# Patient Record
Sex: MALE | Race: WHITE | NOT HISPANIC OR LATINO | Employment: FULL TIME | URBAN - METROPOLITAN AREA
[De-identification: names, ages, dates, MRNs, and addresses within clinical notes are randomized per-mention and may not be internally consistent; named-entity substitution may affect disease eponyms.]

---

## 2020-02-04 ENCOUNTER — HOSPITAL ENCOUNTER (EMERGENCY)
Facility: CLINIC | Age: 60
Discharge: HOME | End: 2020-02-04
Attending: PHYSICIAN ASSISTANT | Admitting: PHYSICIAN ASSISTANT
Payer: COMMERCIAL

## 2020-02-04 ENCOUNTER — APPOINTMENT (OUTPATIENT)
Dept: CT IMAGING | Facility: CLINIC | Age: 60
End: 2020-02-04
Attending: PHYSICIAN ASSISTANT
Payer: COMMERCIAL

## 2020-02-04 VITALS
OXYGEN SATURATION: 96 % | DIASTOLIC BLOOD PRESSURE: 89 MMHG | BODY MASS INDEX: 38.35 KG/M2 | TEMPERATURE: 98 F | RESPIRATION RATE: 16 BRPM | SYSTOLIC BLOOD PRESSURE: 170 MMHG | WEIGHT: 275 LBS | HEART RATE: 98 BPM

## 2020-02-04 DIAGNOSIS — L03.311 CELLULITIS OF LEFT ABDOMINAL WALL: ICD-10-CM

## 2020-02-04 LAB
ALBUMIN SERPL-MCNC: 3.3 G/DL (ref 3.4–5)
ALP SERPL-CCNC: 62 U/L (ref 40–150)
ALT SERPL W P-5'-P-CCNC: 33 U/L (ref 0–70)
ANION GAP SERPL CALCULATED.3IONS-SCNC: 8 MMOL/L (ref 3–14)
AST SERPL W P-5'-P-CCNC: 15 U/L (ref 0–45)
BASOPHILS # BLD AUTO: 0 10E9/L (ref 0–0.2)
BASOPHILS NFR BLD AUTO: 0.3 %
BILIRUB SERPL-MCNC: 1.5 MG/DL (ref 0.2–1.3)
BUN SERPL-MCNC: 24 MG/DL (ref 7–30)
CALCIUM SERPL-MCNC: 9.4 MG/DL (ref 8.5–10.1)
CHLORIDE SERPL-SCNC: 104 MMOL/L (ref 94–109)
CO2 SERPL-SCNC: 23 MMOL/L (ref 20–32)
CREAT SERPL-MCNC: 1 MG/DL (ref 0.66–1.25)
DIFFERENTIAL METHOD BLD: ABNORMAL
EOSINOPHIL # BLD AUTO: 0.1 10E9/L (ref 0–0.7)
EOSINOPHIL NFR BLD AUTO: 0.6 %
ERYTHROCYTE [DISTWIDTH] IN BLOOD BY AUTOMATED COUNT: 13.3 % (ref 10–15)
GFR SERPL CREATININE-BSD FRML MDRD: 82 ML/MIN/{1.73_M2}
GLUCOSE SERPL-MCNC: 371 MG/DL (ref 70–99)
HCT VFR BLD AUTO: 43.4 % (ref 40–53)
HGB BLD-MCNC: 14.5 G/DL (ref 13.3–17.7)
IMM GRANULOCYTES # BLD: 0.1 10E9/L (ref 0–0.4)
IMM GRANULOCYTES NFR BLD: 0.5 %
LACTATE BLD-SCNC: 2 MMOL/L (ref 0.7–2)
LYMPHOCYTES # BLD AUTO: 1.5 10E9/L (ref 0.8–5.3)
LYMPHOCYTES NFR BLD AUTO: 10.3 %
MCH RBC QN AUTO: 30.7 PG (ref 26.5–33)
MCHC RBC AUTO-ENTMCNC: 33.4 G/DL (ref 31.5–36.5)
MCV RBC AUTO: 92 FL (ref 78–100)
MONOCYTES # BLD AUTO: 1.3 10E9/L (ref 0–1.3)
MONOCYTES NFR BLD AUTO: 9.1 %
NEUTROPHILS # BLD AUTO: 11.3 10E9/L (ref 1.6–8.3)
NEUTROPHILS NFR BLD AUTO: 79.2 %
NRBC # BLD AUTO: 0 10*3/UL
NRBC BLD AUTO-RTO: 0 /100
PLATELET # BLD AUTO: 209 10E9/L (ref 150–450)
POTASSIUM SERPL-SCNC: 3.9 MMOL/L (ref 3.4–5.3)
PROT SERPL-MCNC: 7 G/DL (ref 6.8–8.8)
RBC # BLD AUTO: 4.72 10E12/L (ref 4.4–5.9)
SODIUM SERPL-SCNC: 135 MMOL/L (ref 133–144)
WBC # BLD AUTO: 14.2 10E9/L (ref 4–11)

## 2020-02-04 PROCEDURE — 85025 COMPLETE CBC W/AUTO DIFF WBC: CPT | Performed by: PHYSICIAN ASSISTANT

## 2020-02-04 PROCEDURE — 99284 EMERGENCY DEPT VISIT MOD MDM: CPT | Mod: Z6 | Performed by: PHYSICIAN ASSISTANT

## 2020-02-04 PROCEDURE — 25000125 ZZHC RX 250: Performed by: PHYSICIAN ASSISTANT

## 2020-02-04 PROCEDURE — 99285 EMERGENCY DEPT VISIT HI MDM: CPT | Mod: 25 | Performed by: PHYSICIAN ASSISTANT

## 2020-02-04 PROCEDURE — 83605 ASSAY OF LACTIC ACID: CPT | Performed by: PHYSICIAN ASSISTANT

## 2020-02-04 PROCEDURE — 74177 CT ABD & PELVIS W/CONTRAST: CPT

## 2020-02-04 PROCEDURE — 25000128 H RX IP 250 OP 636: Performed by: PHYSICIAN ASSISTANT

## 2020-02-04 PROCEDURE — 80053 COMPREHEN METABOLIC PANEL: CPT | Performed by: PHYSICIAN ASSISTANT

## 2020-02-04 RX ORDER — SULFAMETHOXAZOLE/TRIMETHOPRIM 800-160 MG
1 TABLET ORAL 2 TIMES DAILY
Qty: 20 TABLET | Refills: 0 | Status: SHIPPED | OUTPATIENT
Start: 2020-02-04 | End: 2020-02-14

## 2020-02-04 RX ORDER — CEPHALEXIN 500 MG/1
500 CAPSULE ORAL 4 TIMES DAILY
Qty: 40 CAPSULE | Refills: 0 | Status: SHIPPED | OUTPATIENT
Start: 2020-02-04 | End: 2020-02-14

## 2020-02-04 RX ORDER — TRIAMTERENE AND HYDROCHLOROTHIAZIDE 37.5; 25 MG/1; MG/1
1 CAPSULE ORAL DAILY
COMMUNITY
Start: 2019-08-23 | End: 2024-06-20

## 2020-02-04 RX ORDER — ATORVASTATIN CALCIUM 40 MG/1
40 TABLET, FILM COATED ORAL EVERY EVENING
COMMUNITY
Start: 2019-08-13 | End: 2024-07-01

## 2020-02-04 RX ORDER — IOPAMIDOL 755 MG/ML
100 INJECTION, SOLUTION INTRAVASCULAR ONCE
Status: COMPLETED | OUTPATIENT
Start: 2020-02-04 | End: 2020-02-04

## 2020-02-04 RX ORDER — GLIPIZIDE 5 MG/1
5 TABLET, FILM COATED, EXTENDED RELEASE ORAL
COMMUNITY
Start: 2019-08-13 | End: 2024-07-01

## 2020-02-04 RX ADMIN — IOPAMIDOL 100 ML: 755 INJECTION, SOLUTION INTRAVENOUS at 12:37

## 2020-02-04 RX ADMIN — SODIUM CHLORIDE 74 ML: 9 INJECTION, SOLUTION INTRAVENOUS at 12:38

## 2020-02-04 ASSESSMENT — ENCOUNTER SYMPTOMS
RESPIRATORY NEGATIVE: 1
MUSCULOSKELETAL NEGATIVE: 1
CARDIOVASCULAR NEGATIVE: 1
FEVER: 1
ABDOMINAL PAIN: 1
COLOR CHANGE: 1
CHILLS: 1
WOUND: 1

## 2020-02-04 NOTE — ED AVS SNAPSHOT
[facility]  [address] [address]  Phone:  [phone]  Fax:  [phone]                                    Frank Mace   MRN: [id number]    Department:  [facility]   Date of Visit:  2/4/2020           After Visit Summary Signature Page    I have received my discharge instructions, and my questions have been answered. I have discussed any challenges I see with this plan with the nurse or doctor.    ..........................................................................................................................................  Patient/Patient Representative Signature      ..........................................................................................................................................  Patient Representative Print Name and Relationship to Patient    ..................................................               ................................................  Date                                   Time    ..........................................................................................................................................  Reviewed by Signature/Title    ...................................................              ..............................................  Date                                               Time          22EPIC Rev 08/18

## 2020-02-04 NOTE — ED PROVIDER NOTES
History     Chief Complaint   Patient presents with     Abdominal Pain     lump on left side of abd     HPI  Frank Mace is a 59 year old male with history of diabetes mellitus who presents with complaints of painful lump to the left side of his abdomen over the past 2-3 days.  Patient states he intermittently develops areas of inflammation along where his pants rub.  This area to his left lower abdomen has started to drain fluid.  He states last night he had an episode of chills and sweats.  Denies nausea, vomiting, or urinary symptoms.      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    Past Surgical History:    No past surgical history on file.    Family History:    No family history on file.    Social History:  Marital Status:   [2]  Social History     Tobacco Use     Smoking status: Never Smoker     Smokeless tobacco: Never Used   Substance Use Topics     Alcohol use: Yes     Drug use: No        Medications:    Ascorbic Acid (VITAMIN C PO)  aspirin 325 MG tablet  atorvastatin (LIPITOR) 40 MG tablet  benazepril (LOTENSIN) 40 MG tablet  cephALEXin (KEFLEX) 500 MG capsule  CINNAMON PO  fish oil-omega-3 fatty acids (FISH OIL) 1000 MG capsule  glipiZIDE (GLUCOTROL XL) 10 MG 24 hr tablet  Multiple Vitamins-Minerals (AIRBORNE) CHEW  Multiple Vitamins-Minerals (MULTIVITAL PO)  sulfamethoxazole-trimethoprim (BACTRIM DS) 800-160 MG tablet  triamterene-HCTZ (DYAZIDE) 37.5-25 MG capsule  fluticasone (FLONASE) 50 MCG/ACT nasal spray          Review of Systems   Constitutional: Positive for chills and fever.   Respiratory: Negative.    Cardiovascular: Negative.    Gastrointestinal: Positive for abdominal pain.   Musculoskeletal:  Negative.    Skin: Positive for color change and wound.   All other systems reviewed and are negative.      Physical Exam   BP: (!) 170/89  Pulse: 98  Temp: 98  F (36.7  C)  Resp: 16  Weight: 124.7 kg (275 lb)  SpO2: 96 %      Physical Exam  Constitutional:       General: He is not in acute distress.     Appearance: He is well-developed. He is not ill-appearing, toxic-appearing or diaphoretic.   HENT:      Head: Normocephalic and atraumatic.      Nose: Nose normal.      Mouth/Throat:      Lips: Pink.      Mouth: Mucous membranes are moist.   Eyes:      Conjunctiva/sclera: Conjunctivae normal.      Pupils: Pupils are equal, round, and reactive to light.   Neck:      Musculoskeletal: Normal range of motion and neck supple.   Cardiovascular:      Rate and Rhythm: Normal rate and regular rhythm.      Pulses: Normal pulses.      Heart sounds: Normal heart sounds.   Pulmonary:      Effort: Pulmonary effort is normal. No respiratory distress.      Breath sounds: Normal breath sounds. No stridor. No wheezing.   Abdominal:      General: There is no distension.      Palpations: Abdomen is soft.      Tenderness: There is abdominal tenderness. There is no guarding or rebound.          Comments: Erythema, warmth, swelling, induration, and tenderness to left lower abdomen.  There is a small centrally located wound that is draining clear-appearing fluid.  No fluctuance to the area.   Musculoskeletal: Normal range of motion.   Skin:     General: Skin is warm and dry.      Capillary Refill: Capillary refill takes less than 2 seconds.   Neurological:      Mental Status: He is alert and oriented to person, place, and time.      Sensory: Sensation is intact.      Motor: Motor function is intact.         ED Course        Procedures    Results for orders placed or performed during the hospital encounter of 02/04/20 (from the past 24 hour(s))   CBC with platelets differential   Result Value Ref Range    WBC 14.2 (H) 4.0 - 11.0 10e9/L     RBC Count 4.72 4.4 - 5.9 10e12/L    Hemoglobin 14.5 13.3 - 17.7 g/dL    Hematocrit 43.4 40.0 - 53.0 %    MCV 92 78 - 100 fl    MCH 30.7 26.5 - 33.0 pg    MCHC 33.4 31.5 - 36.5 g/dL    RDW 13.3 10.0 - 15.0 %    Platelet Count 209 150 - 450 10e9/L    Diff Method Automated Method     % Neutrophils 79.2 %    % Lymphocytes 10.3 %    % Monocytes 9.1 %    % Eosinophils 0.6 %    % Basophils 0.3 %    % Immature Granulocytes 0.5 %    Nucleated RBCs 0 0 /100    Absolute Neutrophil 11.3 (H) 1.6 - 8.3 10e9/L    Absolute Lymphocytes 1.5 0.8 - 5.3 10e9/L    Absolute Monocytes 1.3 0.0 - 1.3 10e9/L    Absolute Eosinophils 0.1 0.0 - 0.7 10e9/L    Absolute Basophils 0.0 0.0 - 0.2 10e9/L    Abs Immature Granulocytes 0.1 0 - 0.4 10e9/L    Absolute Nucleated RBC 0.0    Comprehensive metabolic panel   Result Value Ref Range    Sodium 135 133 - 144 mmol/L    Potassium 3.9 3.4 - 5.3 mmol/L    Chloride 104 94 - 109 mmol/L    Carbon Dioxide 23 20 - 32 mmol/L    Anion Gap 8 3 - 14 mmol/L    Glucose 371 (H) 70 - 99 mg/dL    Urea Nitrogen 24 7 - 30 mg/dL    Creatinine 1.00 0.66 - 1.25 mg/dL    GFR Estimate 82 >60 mL/min/[1.73_m2]    GFR Estimate If Black >90 >60 mL/min/[1.73_m2]    Calcium 9.4 8.5 - 10.1 mg/dL    Bilirubin Total 1.5 (H) 0.2 - 1.3 mg/dL    Albumin 3.3 (L) 3.4 - 5.0 g/dL    Protein Total 7.0 6.8 - 8.8 g/dL    Alkaline Phosphatase 62 40 - 150 U/L    ALT 33 0 - 70 U/L    AST 15 0 - 45 U/L   Lactic acid whole blood   Result Value Ref Range    Lactic Acid 2.0 0.7 - 2.0 mmol/L   CT Abdomen Pelvis w Contrast    Narrative    CT ABDOMEN AND PELVIS WITH CONTRAST   2/4/2020 12:48 PM     HISTORY: Left lower quadrant pain and erythema. History of diabetes.    TECHNIQUE: 100 mL Isovue 370 IV were administered. After contrast  administration, volumetric helical sections were acquired from the  lung bases to the ischial tuberosities. Coronal images were also  reconstructed. Radiation dose for this scan was reduced using  automated exposure  control, adjustment of the mA and/or kV according  to patient size, or iterative reconstruction technique.    COMPARISON: None.     FINDINGS: Ill-defined 5.3 cm area of hazy increased density in the  subcutaneous fat overlying the left lower quadrant anteriorly may be  related to bruising or inflammation. No associated fluid collection to  suggest abscess or hematoma.    No bowel obstruction. No convincing evidence for colitis or  diverticulitis. Unremarkable appendix. No free fluid in the pelvis.  Mild atherosclerotic aortoiliac calcification. There are a few small  bilateral renal cortical cysts, with the largest in the lower pole on  the right measuring 2 cm. There is diffuse fatty infiltration of the  liver. The liver, gallbladder, spleen, adrenal glands, pancreas, and  kidneys are otherwise unremarkable. No hydronephrosis. No  intra-abdominal fluid collections. The visualized lung bases are  clear. Degenerative changes are noted in the visualized thoracolumbar  spine.      Impression    IMPRESSION:   1. Ill-defined area of hazy increased density in the subcutaneous fat  overlying the left lower quadrant anteriorly may be related to  bruising or inflammation. No evidence for associated fluid collection.  2. Diffuse fatty infiltration of the liver.    LADY DUVALL MD       Medications   iopamidol (ISOVUE-370) solution 100 mL (100 mLs Intravenous Given 2/4/20 1237)   sodium chloride 0.9 % bag 500mL for CT scan flush use (74 mLs As instructed Given 2/4/20 1238)       Assessments & Plan (with Medical Decision Making)     Pt is a 59 year old male with history of diabetes mellitus who presents with complaints of painful lump to the left side of his abdomen over the past 2-3 days.  This area to his left lower abdomen has started to drain fluid.  He states last night he had an episode of chills and sweats.  Pt is afebrile on arrival.  Exam as above.  Lactic acid is 2.  White blood cell count is 14,000.  CT of  abdomen and pelvis was obtained in order to evaluate for deeper infection/abscess in the abdomen and shows an ill-defined area of hazy increased density in the subcutaneous fat overlying the left lower quadrant; no evidence of associated fluid collection.  Discussed results with patient.  Return precautions were reviewed.  Hand-outs were provided.    Patient was sent with Bactrim and Keflex and was instructed to follow-up with PCP if no improvement in 2-3 days for continued care and management or sooner if new or worsening symptoms.  He is to return to the ED for persistent and/or worsening symptoms.  Patient expressed understanding of the diagnosis and plan and was discharged home in good condition.    I have reviewed the nursing notes.    I have reviewed the findings, diagnosis, plan and need for follow up with the patient.    Discharge Medication List as of 2/4/2020  1:31 PM      START taking these medications    Details   cephALEXin (KEFLEX) 500 MG capsule Take 1 capsule (500 mg) by mouth 4 times daily for 10 days, Disp-40 capsule, R-0, E-Prescribe      sulfamethoxazole-trimethoprim (BACTRIM DS) 800-160 MG tablet Take 1 tablet by mouth 2 times daily for 10 days, Disp-20 tablet, R-0, E-Prescribe             Final diagnoses:   Cellulitis of left abdominal wall       2/4/2020   [facility] EMERGENCY DEPARTMENT      Disclaimer:  This note consists of symbols derived from keyboarding, dictation and/or voice recognition software.  As a result, there may be errors in the script that have gone undetected.  Please consider this when interpreting information found in this chart.     Rosemary Angeles PA-C  02/04/20 1746

## 2020-02-04 NOTE — ED NOTES
Area of redness,swelling and pain LLQ with small draining lesion and one healed lesion that was first noted 2 days ago

## 2023-10-18 ENCOUNTER — TRANSFERRED RECORDS (OUTPATIENT)
Dept: MULTI SPECIALTY CLINIC | Facility: CLINIC | Age: 63
End: 2023-10-18

## 2023-10-18 LAB — RETINOPATHY: NORMAL

## 2024-06-03 ENCOUNTER — LAB REQUISITION (OUTPATIENT)
Dept: LAB | Facility: CLINIC | Age: 64
End: 2024-06-03
Payer: COMMERCIAL

## 2024-06-03 DIAGNOSIS — M25.462 EFFUSION, LEFT KNEE: ICD-10-CM

## 2024-06-03 LAB
APPEARANCE FLD: CLEAR
CELL COUNT BODY FLUID SOURCE: NORMAL
COLOR FLD: YELLOW
CRYSTALS SNV MICRO: NORMAL
LYMPHOCYTES NFR FLD MANUAL: 37 %
MONOS+MACROS NFR FLD MANUAL: 49 %
NEUTS BAND NFR FLD MANUAL: 14 %
WBC # FLD AUTO: 544 /UL

## 2024-06-03 PROCEDURE — 89051 BODY FLUID CELL COUNT: CPT | Mod: ORL | Performed by: PHYSICIAN ASSISTANT

## 2024-06-03 PROCEDURE — 87070 CULTURE OTHR SPECIMN AEROBIC: CPT | Mod: ORL | Performed by: PHYSICIAN ASSISTANT

## 2024-06-03 PROCEDURE — 89060 EXAM SYNOVIAL FLUID CRYSTALS: CPT | Mod: ORL | Performed by: PHYSICIAN ASSISTANT

## 2024-06-08 LAB — BACTERIA SNV CULT: NO GROWTH

## 2024-06-14 ENCOUNTER — TRANSFERRED RECORDS (OUTPATIENT)
Dept: HEALTH INFORMATION MANAGEMENT | Facility: CLINIC | Age: 64
End: 2024-06-14
Payer: COMMERCIAL

## 2024-06-20 ENCOUNTER — OFFICE VISIT (OUTPATIENT)
Dept: FAMILY MEDICINE | Facility: CLINIC | Age: 64
End: 2024-06-20
Payer: COMMERCIAL

## 2024-06-20 VITALS
WEIGHT: 226 LBS | SYSTOLIC BLOOD PRESSURE: 142 MMHG | HEIGHT: 71 IN | DIASTOLIC BLOOD PRESSURE: 70 MMHG | OXYGEN SATURATION: 98 % | RESPIRATION RATE: 16 BRPM | BODY MASS INDEX: 31.64 KG/M2 | HEART RATE: 56 BPM

## 2024-06-20 DIAGNOSIS — E79.0 ELEVATED URIC ACID IN BLOOD: ICD-10-CM

## 2024-06-20 DIAGNOSIS — E11.22 TYPE 2 DIABETES MELLITUS WITH STAGE 3 CHRONIC KIDNEY DISEASE, WITHOUT LONG-TERM CURRENT USE OF INSULIN, UNSPECIFIED WHETHER STAGE 3A OR 3B CKD (H): ICD-10-CM

## 2024-06-20 DIAGNOSIS — D64.9 ANEMIA, UNSPECIFIED TYPE: ICD-10-CM

## 2024-06-20 DIAGNOSIS — E78.2 MIXED HYPERLIPIDEMIA: ICD-10-CM

## 2024-06-20 DIAGNOSIS — Z12.11 COLON CANCER SCREENING: ICD-10-CM

## 2024-06-20 DIAGNOSIS — Z00.00 ROUTINE GENERAL MEDICAL EXAMINATION AT A HEALTH CARE FACILITY: Primary | ICD-10-CM

## 2024-06-20 DIAGNOSIS — N18.30 TYPE 2 DIABETES MELLITUS WITH STAGE 3 CHRONIC KIDNEY DISEASE, WITHOUT LONG-TERM CURRENT USE OF INSULIN, UNSPECIFIED WHETHER STAGE 3A OR 3B CKD (H): ICD-10-CM

## 2024-06-20 DIAGNOSIS — M25.469 SWELLING OF KNEE: ICD-10-CM

## 2024-06-20 DIAGNOSIS — Z12.5 SCREENING FOR PROSTATE CANCER: ICD-10-CM

## 2024-06-20 LAB
ANION GAP SERPL CALCULATED.3IONS-SCNC: 18 MMOL/L (ref 7–15)
BASOPHILS # BLD AUTO: 0 10E3/UL (ref 0–0.2)
BASOPHILS NFR BLD AUTO: 0 %
BUN SERPL-MCNC: 52 MG/DL (ref 8–23)
CALCIUM SERPL-MCNC: 9.9 MG/DL (ref 8.8–10.2)
CHLORIDE SERPL-SCNC: 108 MMOL/L (ref 98–107)
CHOLEST SERPL-MCNC: 149 MG/DL
CREAT SERPL-MCNC: 1.41 MG/DL (ref 0.67–1.17)
DEPRECATED HCO3 PLAS-SCNC: 17 MMOL/L (ref 22–29)
EGFRCR SERPLBLD CKD-EPI 2021: 56 ML/MIN/1.73M2
EOSINOPHIL # BLD AUTO: 0.3 10E3/UL (ref 0–0.7)
EOSINOPHIL NFR BLD AUTO: 3 %
ERYTHROCYTE [DISTWIDTH] IN BLOOD BY AUTOMATED COUNT: 13.7 % (ref 10–15)
FASTING STATUS PATIENT QL REPORTED: YES
FASTING STATUS PATIENT QL REPORTED: YES
FERRITIN SERPL-MCNC: 1160 NG/ML (ref 31–409)
GLUCOSE SERPL-MCNC: 114 MG/DL (ref 70–99)
HBA1C MFR BLD: 5.4 % (ref 0–5.6)
HCT VFR BLD AUTO: 32.9 % (ref 40–53)
HDLC SERPL-MCNC: 37 MG/DL
HGB BLD-MCNC: 11.1 G/DL (ref 13.3–17.7)
IMM GRANULOCYTES # BLD: 0 10E3/UL
IMM GRANULOCYTES NFR BLD: 0 %
IRON BINDING CAPACITY (ROCHE): 267 UG/DL (ref 240–430)
IRON SATN MFR SERPL: 28 % (ref 15–46)
IRON SERPL-MCNC: 75 UG/DL (ref 61–157)
LDLC SERPL CALC-MCNC: 93 MG/DL
LYMPHOCYTES # BLD AUTO: 1 10E3/UL (ref 0.8–5.3)
LYMPHOCYTES NFR BLD AUTO: 10 %
MCH RBC QN AUTO: 29.8 PG (ref 26.5–33)
MCHC RBC AUTO-ENTMCNC: 33.7 G/DL (ref 31.5–36.5)
MCV RBC AUTO: 88 FL (ref 78–100)
MONOCYTES # BLD AUTO: 0.7 10E3/UL (ref 0–1.3)
MONOCYTES NFR BLD AUTO: 7 %
NEUTROPHILS # BLD AUTO: 8.6 10E3/UL (ref 1.6–8.3)
NEUTROPHILS NFR BLD AUTO: 80 %
NONHDLC SERPL-MCNC: 112 MG/DL
PLATELET # BLD AUTO: 227 10E3/UL (ref 150–450)
POTASSIUM SERPL-SCNC: 4.2 MMOL/L (ref 3.4–5.3)
PSA SERPL DL<=0.01 NG/ML-MCNC: 2.17 NG/ML (ref 0–4.5)
RBC # BLD AUTO: 3.72 10E6/UL (ref 4.4–5.9)
SODIUM SERPL-SCNC: 143 MMOL/L (ref 135–145)
TRIGL SERPL-MCNC: 94 MG/DL
URATE SERPL-MCNC: 10 MG/DL (ref 3.4–7)
WBC # BLD AUTO: 10.7 10E3/UL (ref 4–11)

## 2024-06-20 PROCEDURE — 80048 BASIC METABOLIC PNL TOTAL CA: CPT | Performed by: FAMILY MEDICINE

## 2024-06-20 PROCEDURE — G0103 PSA SCREENING: HCPCS | Performed by: FAMILY MEDICINE

## 2024-06-20 PROCEDURE — 85025 COMPLETE CBC W/AUTO DIFF WBC: CPT | Performed by: FAMILY MEDICINE

## 2024-06-20 PROCEDURE — 80061 LIPID PANEL: CPT | Performed by: FAMILY MEDICINE

## 2024-06-20 PROCEDURE — 99214 OFFICE O/P EST MOD 30 MIN: CPT | Mod: 25 | Performed by: FAMILY MEDICINE

## 2024-06-20 PROCEDURE — 84550 ASSAY OF BLOOD/URIC ACID: CPT | Performed by: FAMILY MEDICINE

## 2024-06-20 PROCEDURE — 83036 HEMOGLOBIN GLYCOSYLATED A1C: CPT | Performed by: FAMILY MEDICINE

## 2024-06-20 PROCEDURE — 99386 PREV VISIT NEW AGE 40-64: CPT | Performed by: FAMILY MEDICINE

## 2024-06-20 PROCEDURE — 82728 ASSAY OF FERRITIN: CPT | Performed by: FAMILY MEDICINE

## 2024-06-20 PROCEDURE — 86618 LYME DISEASE ANTIBODY: CPT | Performed by: FAMILY MEDICINE

## 2024-06-20 PROCEDURE — 83540 ASSAY OF IRON: CPT | Performed by: FAMILY MEDICINE

## 2024-06-20 PROCEDURE — 36415 COLL VENOUS BLD VENIPUNCTURE: CPT | Performed by: FAMILY MEDICINE

## 2024-06-20 PROCEDURE — 83550 IRON BINDING TEST: CPT | Performed by: FAMILY MEDICINE

## 2024-06-20 RX ORDER — HYDROCHLOROTHIAZIDE 25 MG/1
25 TABLET ORAL
COMMUNITY
Start: 2024-06-05 | End: 2024-07-01

## 2024-06-20 RX ORDER — METOPROLOL SUCCINATE 50 MG/1
TABLET, EXTENDED RELEASE ORAL
COMMUNITY
Start: 2023-11-30 | End: 2024-07-01

## 2024-06-20 RX ORDER — HYDRALAZINE HYDROCHLORIDE 10 MG/1
TABLET, FILM COATED ORAL
COMMUNITY
Start: 2023-06-24 | End: 2024-07-01

## 2024-06-20 RX ORDER — BACILLUS COAGULANS 1B CELL
CAPSULE ORAL
COMMUNITY
End: 2024-06-20

## 2024-06-20 RX ORDER — LOSARTAN POTASSIUM 25 MG/1
TABLET ORAL
COMMUNITY
End: 2024-07-01

## 2024-06-20 RX ORDER — AMLODIPINE BESYLATE 5 MG/1
TABLET ORAL
COMMUNITY
Start: 2022-10-21 | End: 2024-07-01

## 2024-06-20 RX ORDER — AMOXICILLIN 500 MG/1
TABLET, FILM COATED ORAL
COMMUNITY

## 2024-06-20 RX ORDER — GABAPENTIN 300 MG/1
300 CAPSULE ORAL
COMMUNITY
Start: 2024-06-11 | End: 2024-07-01

## 2024-06-20 RX ORDER — VITAMIN B COMPLEX
1000 TABLET ORAL
COMMUNITY

## 2024-06-20 RX ORDER — PYRIDOXINE HCL (VITAMIN B6) 100 MG
TABLET ORAL
COMMUNITY

## 2024-06-20 SDOH — HEALTH STABILITY: PHYSICAL HEALTH: ON AVERAGE, HOW MANY DAYS PER WEEK DO YOU ENGAGE IN MODERATE TO STRENUOUS EXERCISE (LIKE A BRISK WALK)?: 0 DAYS

## 2024-06-20 ASSESSMENT — SOCIAL DETERMINANTS OF HEALTH (SDOH): HOW OFTEN DO YOU GET TOGETHER WITH FRIENDS OR RELATIVES?: ONCE A WEEK

## 2024-06-20 NOTE — PROGRESS NOTES
Preventive Care Visit  [facility]  JUNE ALDRICH DO, Family Medicine  Jun 20, 2024      Assessment & Plan     Routine general medical examination at a health care facility    Type 2 diabetes mellitus with stage 3 chronic kidney disease, without long-term current use of insulin, unspecified whether stage 3a or 3b CKD (H)  Recheck A1c-well controlled  pn glipizide cirrenty   - Hemoglobin A1c  - Basic metabolic panel  (Ca, Cl, CO2, Creat, Gluc, K, Na, BUN)  - Hemoglobin A1c  - Basic metabolic panel  (Ca, Cl, CO2, Creat, Gluc, K, Na, BUN)    Swelling of knee  Unclear cause it has slowly improved but does note some swelling in his left ankle as well-had one swelling on ankle about 10 years ago without fluid analysis that was told was possibly gout. On fluid analysis from [facility] of knee it did not show crystals thus I am thinking less likely gout flare. Hesitatnt to start urate lowering therapy in setting of his CKD  - Uric acid  - Lyme Disease Total Abs Bld with Reflex to Confirm CLIA  - Uric acid  - Lyme Disease Total Abs Bld with Reflex to Confirm CLIA    Anemia, unspecified type  He has MGUS-reviewed his CBC and noted ot have worsening anemia starting in March, 2023-he started on iron supplements in November of 2023 at direction of his orthopedic team after surgery. HE has remained anemic with mild improvement in anemia-ferritin is elevated in setting of his CKD and MGUS, however iron stores are low normal despite taking iron. He will update his cologuard-eats iron rich foods. Can consider colonscopy if needed. Follow up with his oncologist   - CBC with platelets and differential  - Ferritin  - Iron and iron binding capacity  - CBC with platelets and differential  - Ferritin  - Iron and iron binding capacity    Mixed hyperlipidemia  - Lipid panel reflex to direct LDL Fasting  - Lipid panel reflex to direct LDL Fasting    Screening for prostate cancer  - PSA, screen  - PSA,  "screen    Elevated uric acid in blood  No crystals seen on fluid analysis of left knee after swelling-discussed likely not gout. ? Of elevation in setting of his CKD       Patient has been advised of split billing requirements and indicates understanding: Yes        BMI  Estimated body mass index is 31.52 kg/m  as calculated from the following:    Height as of this encounter: 1.803 m (5' 11\").    Weight as of this encounter: 102.5 kg (226 lb).       Counseling  Appropriate preventive services were discussed with this patient, including applicable screening as appropriate for fall prevention, nutrition, physical activity, Tobacco-use cessation, weight loss and cognition.  Checklist reviewing preventive services available has been given to the patient.  Reviewed patient's diet, addressing concerns and/or questions.       Mathew Marie is a 63 year old, presenting for the following:  Physical and Establish Care        6/20/2024     8:22 AM   Additional Questions   Roomed by Kecia SOLIS MA   Accompanied by Self        Health Care Directive  Patient does not have a Health Care Directive or Living Will: Discussed advance care planning with patient; information given to patient to review.    HPI    Discuss a couple prescriptions    Used to go to [facility] in [place]    Would like his PSA checked.  Did see the urologist last Friday and had a cystoscopy done.  Also uric acid levels checked for gout.  A few weeks ago, left knee became swollen.  Fluid was aspirated from the joint at [facility] and tested negative for gout.    Nephrologist wants him to ask about SGLT-2 inhibitor and a GLP-1 agonist.    Low hemoglobin.  Started taking iron supplements since his knee replacement.  Hgb was checked while on the iron and didn't increase.    Gabapentin-300 mg daily since December.  Last couple weeks ran out and has since gotten a refill.  Would like to just stay off of it.  Was taking it for neuropathy    MGUS-following with oncology "     Is fasting today for labs  HX of mild neuropathy-tried gabapentin but did not seem help.   Saw [facility] amonth ago swelling in left knee and ankle - Wondering if this is from lymes or gout-fluid studies from [facility] showed no infection or crystals         6/20/2024   General Health   How would you rate your overall physical health? (!) POOR   Feel stress (tense, anxious, or unable to sleep) To some extent      (!) STRESS CONCERN      6/20/2024   Nutrition   Three or more servings of calcium each day? (!) NO   Diet: Low salt    Carbohydrate counting    Other   If other, please elaborate: modified paleo   How many servings of fruit and vegetables per day? 4 or more   How many sweetened beverages each day? 0-1       Multiple values from one day are sorted in reverse-chronological order         6/20/2024   Exercise   Days per week of moderate/strenous exercise 0 days      (!) EXERCISE CONCERN      6/20/2024   Social Factors   Frequency of gathering with friends or relatives Once a week   Worry food won't last until get money to buy more No   Food not last or not have enough money for food? No   Do you have housing? (Housing is defined as stable permanent housing and does not include staying ouside in a car, in a tent, in an abandoned building, in an overnight shelter, or couch-surfing.) Yes   Are you worried about losing your housing? No   Lack of transportation? No   Unable to get utilities (heat,electricity)? No          6/20/2024   Fall Risk   Fallen 2 or more times in the past year? No   Trouble with walking or balance? Yes   Gait Speed Test (Document in seconds) 3   Gait Speed Test Interpretation Less than or equal to 5.00 seconds - PASS            6/20/2024   Dental   Dentist two times every year? Yes            6/20/2024   TB Screening   Were you born outside of the [place]? No      Today's PHQ-2 Score:       6/20/2024     8:10 AM   PHQ-2 ( 1999 [employer])   Q1: Little interest or pleasure in doing things  "2   Q2: Feeling down, depressed or hopeless 0   PHQ-2 Score 2   Q1: Little interest or pleasure in doing things More than half the days   Q2: Feeling down, depressed or hopeless Not at all   PHQ-2 Score 2         6/20/2024   Substance Use   Alcohol more than 3/day or more than 7/wk No   Do you use any other substances recreationally? (!) OTHER        Social History     Tobacco Use    Smoking status: Never     Passive exposure: Never    Smokeless tobacco: Never   Vaping Use    Vaping status: Never Used   Substance Use Topics    Alcohol use: Yes    Drug use: No         6/20/2024   STI Screening   New sexual partner(s) since last STI/HIV test? No      Last PSA:   Prostate Specific Antigen Screen   Date Value Ref Range Status   06/20/2024 2.17 0.00 - 4.50 ng/mL Final     ASCVD Risk   The 10-year ASCVD risk score (Jadon PADILLA, et al., 2019) is: 21.1%    Values used to calculate the score:      Age: 63 years      Sex: Male      Is Non- : No      Diabetic: Yes      Tobacco smoker: No      Systolic Blood Pressure: 142 mmHg      Is BP treated: No      HDL Cholesterol: 37 mg/dL      Total Cholesterol: 149 mg/dL    Reviewed and updated as needed this visit by Provider                    No past medical history on file.  No past surgical history on file.      Review of Systems  Constitutional, HEENT, cardiovascular, pulmonary, gi and gu systems are negative, except as otherwise noted.     Objective    Exam  BP (!) 142/70 (BP Location: Right arm, Patient Position: Chair, Cuff Size: Adult Large)   Pulse 56   Resp 16   Ht 1.803 m (5' 11\")   Wt 102.5 kg (226 lb)   SpO2 98%   BMI 31.52 kg/m     Estimated body mass index is 31.52 kg/m  as calculated from the following:    Height as of this encounter: 1.803 m (5' 11\").    Weight as of this encounter: 102.5 kg (226 lb).    Physical Exam  Constitutional:       Appearance: Normal appearance.   HENT:      Right Ear: Tympanic membrane normal.      Left " Ear: Tympanic membrane normal.      Mouth/Throat:      Mouth: Mucous membranes are moist.   Eyes:      Pupils: Pupils are equal, round, and reactive to light.   Cardiovascular:      Rate and Rhythm: Normal rate and regular rhythm.      Pulses: Normal pulses.      Heart sounds: Normal heart sounds.   Pulmonary:      Effort: Pulmonary effort is normal.   Abdominal:      General: Bowel sounds are normal.   Skin:     General: Skin is warm and dry.   Neurological:      General: No focal deficit present.      Mental Status: He is alert.   Psychiatric:         Mood and Affect: Mood normal.         Thought Content: Thought content normal.           Signed Electronically by: JUNE ALDRICH DO

## 2024-06-20 NOTE — PATIENT INSTRUCTIONS
"Patient Education   Preventive Care Advice   This is general advice we often give to help people stay healthy. Your care team may have specific advice just for you. Please talk to your care team about your own preventive care needs.  Lifestyle  Exercise at least 150 minutes each week (30 minutes a day, 5 days a week).  Do muscle strengthening activities 2 days a week. These help control your weight and prevent disease.  No smoking.  Wear sunscreen to prevent skin cancer.  Have your home tested for radon every 2 to 5 years. Radon is a colorless, odorless gas that can harm your lungs. To learn more, go to www.health.[facility].[place].us and search for \"Radon in Homes.\"  Keep guns unloaded and locked up in a safe place like a safe or gun vault, or, use a gun lock and hide the keys. Always lock away bullets separately. To learn more, visit [employer].[place].gov and search for \"safe gun storage.\"  Nutrition  Eat 5 or more servings of fruits and vegetables each day.  Try wheat bread, brown rice and whole grain pasta (instead of white bread, rice, and pasta).  Get enough calcium and vitamin D. Check the label on foods and aim for 100% of the RDA (recommended daily allowance).  Regular exams  Have a dental exam and cleaning every 6 months.  See your health care team every year to talk about:  Any changes in your health.  Any medicines your care team has prescribed.  Preventive care, family planning, and ways to prevent chronic diseases.  Shots (vaccines)   HPV shots (up to age 26), if you've never had them before.  Hepatitis B shots (up to age 59), if you've never had them before.  COVID-19 shot: Get this shot when it's due.  Flu shot: Get a flu shot every year.  Tetanus shot: Get a tetanus shot every 10 years.  Pneumococcal, hepatitis A, and RSV shots: Ask your care team if you need these based on your risk.  Shingles shot (for age 50 and up).  General health tests  Diabetes screening:  Starting at age 35, Get screened for diabetes at least " every 3 years.  If you are younger than age 35, ask your care team if you should be screened for diabetes.  Cholesterol test: At age 39, start having a cholesterol test every 5 years, or more often if advised.  Bone density scan (DEXA): At age 50, ask your care team if you should have this scan for osteoporosis (brittle bones).  Hepatitis C: Get tested at least once in your life.  Abdominal aortic aneurysm screening: Talk to your doctor about having this screening if you:  Have ever smoked; and  Are biologically male; and  Are between the ages of 65 and 75.  STIs (sexually transmitted infections)  Before age 24: Ask your care team if you should be screened for STIs.  After age 24: Get screened for STIs if you're at risk. You are at risk for STIs (including HIV) if:  You are sexually active with more than one person.  You don't use condoms every time.  You or a partner was diagnosed with a sexually transmitted infection.  If you are at risk for HIV, ask about PrEP medicine to prevent HIV.  Get tested for HIV at least once in your life, whether you are at risk for HIV or not.  Cancer screening tests  Cervical cancer screening: If you have a cervix, begin getting regular cervical cancer screening tests at age 21. Most people who have regular screenings with normal results can stop after age 65. Talk about this with your provider.  Breast cancer scan (mammogram): If you've ever had breasts, begin having regular mammograms starting at age 40. This is a scan to check for breast cancer.  Colon cancer screening: It is important to start screening for colon cancer at age 45.  Have a colonoscopy test every 10 years (or more often if you're at risk) Or, ask your provider about stool tests like a FIT test every year or Cologuard test every 3 years.  To learn more about your testing options, visit: www.[employer]/567819.pdf.  For help making a decision, visit: amaury/ap24555.  Prostate cancer screening test: If you have a  prostate and are age 55 to 69, ask your provider if you would benefit from a yearly prostate cancer screening test.  Lung cancer screening: If you are a current or former smoker age 50 to 80, ask your care team if ongoing lung cancer screenings are right for you.  For informational purposes only. Not to replace the advice of your health care provider. Copyright   2023 [facility]. All rights reserved. Clinically reviewed by the  [facility]. [employer] [id number] - REV 04/24.  Preventing Falls: Care Instructions  Injuries and health problems such as trouble walking or poor eyesight can increase your risk of falling. So can some medicines. But there are things you can do to help prevent falls. You can exercise to get stronger. You can also arrange your home to make it safer.    Talk to your doctor about the medicines you take. Ask if any of them increase the risk of falls and whether they can be changed or stopped.   Try to exercise regularly. It can help improve your strength and balance. This can help lower your risk of falling.     Practice fall safety and prevention.    Wear low-heeled shoes that fit well and give your feet good support. Talk to your doctor if you have foot problems that make this hard.  Carry a cellphone or wear a medical alert device that you can use to call for help.  Use stepladders instead of chairs to reach high objects. Don't climb if you're at risk for falls. Ask for help, if needed.  Wear the correct eyeglasses, if you need them.    Make your home safer.    Remove rugs, cords, clutter, and furniture from walkways.  Keep your house well lit. Use night-lights in hallways and bathrooms.  Install and use sturdy handrails on stairways.  Wear nonskid footwear, even inside. Don't walk barefoot or in socks without shoes.    Be safe outside.    Use handrails, curb cuts, and ramps whenever possible.  Keep your hands free by using a shoulder bag or backpack.  Try  "to walk in well-lit areas. Watch out for uneven ground, changes in pavement, and debris.  Be careful in the winter. Walk on the grass or gravel when sidewalks are slippery. Use de-icer on steps and walkways. Add non-slip devices to shoes.    Put grab bars and nonskid mats in your shower or tub and near the toilet. Try to use a shower chair or bath bench when bathing.   Get into a tub or shower by putting in your weaker leg first. Get out with your strong side first. Have a phone or medical alert device in the bathroom with you.   Where can you learn more?  Go to https://www.[employer].net/patiented  Enter G117 in the search box to learn more about \"Preventing Falls: Care Instructions.\"  Current as of: July 17, 2023               Content Version: 14.0    6082-6379 [employer].   Care instructions adapted under license by your healthcare professional. If you have questions about a medical condition or this instruction, always ask your healthcare professional. [employer] disclaims any warranty or liability for your use of this information.      Substance Use Disorder: Care Instructions  Overview     You can improve your life and health by stopping your use of alcohol or drugs. When you don't drink or use drugs, you may feel and sleep better. You may get along better with your family, friends, and coworkers. There are medicines and programs that can help with substance use disorder.  How can you care for yourself at home?  Here are some ways to help you stay sober and prevent relapse.  If you have been given medicine to help keep you sober or reduce your cravings, be sure to take it exactly as prescribed.  Talk to your doctor about programs that can help you stop using drugs or drinking alcohol.  Do not keep alcohol or drugs in your home.  Plan ahead. Think about what you'll say if other people ask you to drink or use drugs. Try not to spend time with people who drink or use drugs.  Use the " time and money spent on drinking or drugs to do something that's important to you.  Preventing a relapse  Have a plan to deal with relapse. Learn to recognize changes in your thinking that lead you to drink or use drugs. Get help before you start to drink or use drugs again.  Try to stay away from situations, friends, or places that may lead you to drink or use drugs.  If you feel the need to drink alcohol or use drugs again, seek help right away. Call a trusted friend or family member. Some people get support from organizations such as Narcotics Anonymous or [employer] or from treatment facilities.  If you relapse, get help as soon as you can. Some people make a plan with another person that outlines what they want that person to do for them if they relapse. The plan usually includes how to handle the relapse and who to notify in case of relapse.  Don't give up. Remember that a relapse doesn't mean that you have failed. Use the experience to learn the triggers that lead you to drink or use drugs. Then quit again. Recovery is a lifelong process. Many people have several relapses before they are able to quit for good.  Follow-up care is a key part of your treatment and safety. Be sure to make and go to all appointments, and call your doctor if you are having problems. It's also a good idea to know your test results and keep a list of the medicines you take.  When should you call for help?   Call 911  anytime you think you may need emergency care. For example, call if you or someone else:    Has overdosed or has withdrawal signs. Be sure to tell the emergency workers that you are or someone else is using or trying to quit using drugs. Overdose or withdrawal signs may include:  Losing consciousness.  Seizure.  Seeing or hearing things that aren't there (hallucinations).     Is thinking or talking about suicide or harming others.   Where to get help 24 hours a day, 7 days a week   If you or someone you know talks  "about suicide, self-harm, a mental health crisis, a substance use crisis, or any other kind of emotional distress, get help right away. You can:    Call the Suicide and Crisis Lifeline at 988.     Call 0-293-821-RDRF (1-836.667.2902).     Text HOME to 293197 to access the Crisis Text Line.   Consider saving these numbers in your phone.  Go to Kii for more information or to chat online.  Call your doctor now or seek immediate medical care if:    You are having withdrawal symptoms. These may include nausea or vomiting, sweating, shakiness, and anxiety.   Watch closely for changes in your health, and be sure to contact your doctor if:    You have a relapse.     You need more help or support to stop.   Where can you learn more?  Go to https://www.TabUp.net/patiented  Enter H573 in the search box to learn more about \"Substance Use Disorder: Care Instructions.\"  Current as of: November 15, 2023               Content Version: 14.0    2685-1819 1-800-DENTIST.   Care instructions adapted under license by your healthcare professional. If you have questions about a medical condition or this instruction, always ask your healthcare professional. 1-800-DENTIST disclaims any warranty or liability for your use of this information.         "

## 2024-06-21 LAB — B BURGDOR IGG+IGM SER QL: 0.04

## 2024-06-25 ENCOUNTER — LAB (OUTPATIENT)
Dept: LAB | Facility: CLINIC | Age: 64
End: 2024-06-25
Payer: COMMERCIAL

## 2024-06-25 DIAGNOSIS — Z12.11 COLON CANCER SCREENING: ICD-10-CM

## 2024-06-25 PROCEDURE — 82274 ASSAY TEST FOR BLOOD FECAL: CPT | Performed by: FAMILY MEDICINE

## 2024-07-01 ENCOUNTER — VIRTUAL VISIT (OUTPATIENT)
Dept: FAMILY MEDICINE | Facility: CLINIC | Age: 64
End: 2024-07-01
Payer: COMMERCIAL

## 2024-07-01 DIAGNOSIS — N18.30 TYPE 2 DIABETES MELLITUS WITH STAGE 3 CHRONIC KIDNEY DISEASE, WITHOUT LONG-TERM CURRENT USE OF INSULIN, UNSPECIFIED WHETHER STAGE 3A OR 3B CKD (H): Primary | ICD-10-CM

## 2024-07-01 DIAGNOSIS — I10 BENIGN ESSENTIAL HYPERTENSION: ICD-10-CM

## 2024-07-01 DIAGNOSIS — M79.89 SWELLING OF LEFT FOOT: ICD-10-CM

## 2024-07-01 DIAGNOSIS — N18.30 STAGE 3 CHRONIC KIDNEY DISEASE, UNSPECIFIED WHETHER STAGE 3A OR 3B CKD (H): ICD-10-CM

## 2024-07-01 DIAGNOSIS — E79.0 HYPERURICEMIA: ICD-10-CM

## 2024-07-01 DIAGNOSIS — E78.5 HYPERLIPIDEMIA, UNSPECIFIED HYPERLIPIDEMIA TYPE: ICD-10-CM

## 2024-07-01 DIAGNOSIS — E11.22 TYPE 2 DIABETES MELLITUS WITH STAGE 3 CHRONIC KIDNEY DISEASE, WITHOUT LONG-TERM CURRENT USE OF INSULIN, UNSPECIFIED WHETHER STAGE 3A OR 3B CKD (H): Primary | ICD-10-CM

## 2024-07-01 LAB — HEMOCCULT STL QL IA: NEGATIVE

## 2024-07-01 PROCEDURE — 99443 PR PHYSICIAN TELEPHONE EVALUATION 21-30 MIN: CPT | Mod: 93 | Performed by: FAMILY MEDICINE

## 2024-07-01 RX ORDER — METOPROLOL SUCCINATE 50 MG/1
50 TABLET, EXTENDED RELEASE ORAL DAILY
Qty: 90 TABLET | Refills: 3 | Status: SHIPPED | OUTPATIENT
Start: 2024-07-01

## 2024-07-01 RX ORDER — LOSARTAN POTASSIUM 25 MG/1
25 TABLET ORAL DAILY
Qty: 90 TABLET | Refills: 3 | Status: SHIPPED | OUTPATIENT
Start: 2024-07-01

## 2024-07-01 RX ORDER — HYDROCHLOROTHIAZIDE 25 MG/1
25 TABLET ORAL DAILY
Qty: 90 TABLET | Refills: 3 | Status: SHIPPED | OUTPATIENT
Start: 2024-07-01

## 2024-07-01 RX ORDER — ATORVASTATIN CALCIUM 40 MG/1
40 TABLET, FILM COATED ORAL EVERY EVENING
Qty: 90 TABLET | Refills: 3 | Status: SHIPPED | OUTPATIENT
Start: 2024-07-01

## 2024-07-01 RX ORDER — AMLODIPINE BESYLATE 5 MG/1
5 TABLET ORAL DAILY
Qty: 90 TABLET | Refills: 3 | Status: SHIPPED | OUTPATIENT
Start: 2024-07-01

## 2024-07-01 NOTE — PROGRESS NOTES
Frank is a 63 year old who is being evaluated via a billable telephone visit.    What phone number would you like to be contacted at? [phone]  How would you like to obtain your AVS? Shelli  Originating Location (pt. Location): Home    Distant Location (provider location):  On-site    Assessment & Plan     Type 2 diabetes mellitus with stage 3 chronic kidney disease, without long-term current use of insulin, unspecified whether stage 3a or 3b CKD (H)  Plan to transition off of glipizide and to jardiance for better benefits for his CKD-follow up in1 month for repeat bmp. Adverse effects reviewed  - empagliflozin (JARDIANCE) 10 MG TABS tablet  Dispense: 90 tablet; Refill: 1    Stage 3 chronic kidney disease, unspecified whether stage 3a or 3b CKD (H)  Follows with Dr. Doss-has appt this fall  - empagliflozin (JARDIANCE) 10 MG TABS tablet  Dispense: 90 tablet; Refill: 1  - losartan (COZAAR) 25 MG tablet  Dispense: 90 tablet; Refill: 3    Benign essential hypertension  Refills of his scripts provided. Continue to monitor BP at home  - losartan (COZAAR) 25 MG tablet  Dispense: 90 tablet; Refill: 3  - amLODIPine (NORVASC) 5 MG tablet  Dispense: 90 tablet; Refill: 3  - metoprolol succinate ER (TOPROL XL) 50 MG 24 hr tablet  Dispense: 90 tablet; Refill: 3  - hydrochlorothiazide (HYDRODIURIL) 25 MG tablet  Dispense: 90 tablet; Refill: 3    Hyperlipidemia, unspecified hyperlipidemia type  - atorvastatin (LIPITOR) 40 MG tablet  Dispense: 90 tablet; Refill: 3    Swelling of left foot  Unclear cause happened at similair time of his left knee that was aspirated at [facility]-this was non revelaling and no crystals seen. HE does have hyperuricemia however unsure if this is gout or something else-per patient x rays have been normal wondering of he needs MRI-recommend meeting with sports medicine  - Orthopedic  Referral    Hyperuricemia  Unclear cause-he does not appear to have gout as joint aspirations have been negative. ?  "Of side effect from his CKD and on hydrochlorothiazide. I have recommended he discuss with his oncologist and nephrologist            BMI  Estimated body mass index is 31.52 kg/m  as calculated from the following:    Height as of 6/20/24: 1.803 m (5' 11\").    Weight as of 6/20/24: 102.5 kg (226 lb).             Mathew Marie is a 63 year old, presenting for the following health issues:  RECHECK and Results        7/1/2024     8:26 AM   Additional Questions   Roomed by Kecia SOLIS MA   Accompanied by Self     HPI     Follow up to discuss concerns on last visit on 6/20/24 and to follow up on labs.    [facility]-left foot     Review of Systems  Constitutional, HEENT, cardiovascular, pulmonary, gi and gu systems are negative, except as otherwise noted.      Objective           Vitals:  No vitals were obtained today due to virtual visit.    Physical Exam   General: Alert and no distress //Respiratory: No audible wheeze, cough, or shortness of breath // Psychiatric:  Appropriate affect, tone, and pace of words        Phone call duration: 35 minutes  Signed Electronically by: JUNE ALDRICH DO    "

## 2024-07-08 ENCOUNTER — ANCILLARY PROCEDURE (OUTPATIENT)
Dept: GENERAL RADIOLOGY | Facility: CLINIC | Age: 64
End: 2024-07-08
Attending: PODIATRIST
Payer: COMMERCIAL

## 2024-07-08 ENCOUNTER — OFFICE VISIT (OUTPATIENT)
Dept: PODIATRY | Facility: CLINIC | Age: 64
End: 2024-07-08
Payer: COMMERCIAL

## 2024-07-08 VITALS
HEIGHT: 71 IN | SYSTOLIC BLOOD PRESSURE: 154 MMHG | WEIGHT: 239 LBS | DIASTOLIC BLOOD PRESSURE: 76 MMHG | BODY MASS INDEX: 33.46 KG/M2

## 2024-07-08 DIAGNOSIS — M65.972 SYNOVITIS OF LEFT ANKLE: ICD-10-CM

## 2024-07-08 DIAGNOSIS — M25.572 PAIN AND SWELLING OF LEFT ANKLE: ICD-10-CM

## 2024-07-08 DIAGNOSIS — M25.472 PAIN AND SWELLING OF LEFT ANKLE: ICD-10-CM

## 2024-07-08 DIAGNOSIS — M76.72 PERONEAL TENDONITIS OF LEFT LOWER EXTREMITY: Primary | ICD-10-CM

## 2024-07-08 DIAGNOSIS — M79.672 LEFT FOOT PAIN: ICD-10-CM

## 2024-07-08 PROCEDURE — 99203 OFFICE O/P NEW LOW 30 MIN: CPT | Performed by: PODIATRIST

## 2024-07-08 PROCEDURE — 73610 X-RAY EXAM OF ANKLE: CPT | Mod: TC | Performed by: RADIOLOGY

## 2024-07-08 PROCEDURE — 73630 X-RAY EXAM OF FOOT: CPT | Mod: TC | Performed by: RADIOLOGY

## 2024-07-08 ASSESSMENT — PAIN SCALES - GENERAL: PAINLEVEL: SEVERE PAIN (7)

## 2024-07-08 NOTE — LETTER
7/8/2024      Frank Mace  [address] [place] [address]      Dear Colleague,    Thank you for referring your patient, Frank Mace, to the [facility]. Please see a copy of my visit note below.    HPI:  Frank Mace is a 63 year old male who is seen in consultation at the request of Sofia Colvin DO    Pt presents for eval of:   (Onset, Location, L/R, Character, Treatments, Injury if yes)    XR Left foot and ankle 7/8/2024    Labs June and July 2024     Onset April 2024, lateral Left foot pain while doing heel slide PT exercise. June 2024, posterior medial and lateral aspect of Left heel pain and swelling. Foot is improved but worse in the ankle/heel. Issues with Left knee pain w/aspiration at [facility]. Walks with a limp.    Wears orthotics. Carbon Fiber insert. Few weeks with tall cam boot. Wears Ha[employer]ity house shoes.  Improvement with 1 week of prednisone.    Works as a  at [employer].      ROS:  10 point ROS neg other than the symptoms noted above in the HPI.    Patient Active Problem List   Diagnosis     Arthrosis       PAST MEDICAL HISTORY: History reviewed. No pertinent past medical history.     PAST SURGICAL HISTORY: History reviewed. No pertinent surgical history.     MEDICATIONS:   Current Outpatient Medications:      amLODIPine (NORVASC) 5 MG tablet, Take 1 tablet (5 mg) by mouth daily, Disp: 90 tablet, Rfl: 3     amoxicillin (AMOXIL) 500 MG tablet, TAKE 4 TABLETS 1 HOUR BEFORE DENTAL APPOINTMENT. (Patient not taking: Reported on 6/20/2024), Disp: , Rfl:      atorvastatin (LIPITOR) 40 MG tablet, Take 1 tablet (40 mg) by mouth every evening, Disp: 90 tablet, Rfl: 3     empagliflozin (JARDIANCE) 10 MG TABS tablet, Take 1 tablet (10 mg) by mouth daily, Disp: 90 tablet, Rfl: 1     Ferrous Fumarate (IRON) 18 MG TBCR, , Disp: , Rfl:      Ferrous Sulfate (IRON) 28 MG TABS, , Disp: , Rfl:      hydrochlorothiazide (HYDRODIURIL) 25 MG tablet, Take 1  tablet (25 mg) by mouth daily, Disp: 90 tablet, Rfl: 3     losartan (COZAAR) 25 MG tablet, Take 1 tablet (25 mg) by mouth daily, Disp: 90 tablet, Rfl: 3     metoprolol succinate ER (TOPROL XL) 50 MG 24 hr tablet, Take 1 tablet (50 mg) by mouth daily, Disp: 90 tablet, Rfl: 3     Multiple Vitamins-Minerals (MULTIVITAL PO), Take 1 tablet by mouth daily , Disp: , Rfl:      Vitamin D3 (CHOLECALCIFEROL) 25 mcg (1000 units) tablet, Take 1,000 Units by mouth, Disp: , Rfl:      ALLERGIES:    Allergies   Allergen Reactions     Codeine Nausea     And dizzy     Iodinated Contrast Media Other (See Comments)     Avoids for kidney disease     Lactose      Peanuts [Nuts] Other (See Comments)     Burps for days        SOCIAL HISTORY:   Social History     Socioeconomic History     Marital status:      Spouse name: Not on file     Number of children: Not on file     Years of education: Not on file     Highest education level: Not on file   Occupational History     Not on file   Tobacco Use     Smoking status: Never     Passive exposure: Never     Smokeless tobacco: Never   Vaping Use     Vaping status: Never Used   Substance and Sexual Activity     Alcohol use: Yes     Drug use: No     Sexual activity: Not on file   Other Topics Concern     Not on file   Social History Narrative     Not on file     Social Determinants of Health     Financial Resource Strain: Low Risk  (6/20/2024)    Financial Resource Strain      Within the past 12 months, have you or your family members you live with been unable to get utilities (heat, electricity) when it was really needed?: No   Food Insecurity: Low Risk  (6/20/2024)    Food Insecurity      Within the past 12 months, did you worry that your food would run out before you got money to buy more?: No      Within the past 12 months, did the food you bought just not last and you didn t have money to get more?: No   Transportation Needs: Low Risk  (6/20/2024)    Transportation Needs      Within the  "past 12 months, has lack of transportation kept you from medical appointments, getting your medicines, non-medical meetings or appointments, work, or from getting things that you need?: No   Physical Activity: Unknown (6/20/2024)    Exercise Vital Sign      Days of Exercise per Week: 0 days      Minutes of Exercise per Session: Not on file   Stress: Stress Concern Present (6/20/2024)    [employer] - Occupational Stress Questionnaire      Feeling of Stress : To some extent   Social Connections: Unknown (6/20/2024)    Social Connection and Isolation Panel [NHANES]      Frequency of Communication with Friends and Family: Not on file      Frequency of Social Gatherings with Friends and Family: Once a week      Attends Baptism Services: Not on file      Active Member of Clubs or Organizations: Not on file      Attends Club or Organization Meetings: Not on file      Marital Status: Not on file   Interpersonal Safety: Low Risk  (6/20/2024)    Interpersonal Safety      Do you feel physically and emotionally safe where you currently live?: Yes      Within the past 12 months, have you been hit, slapped, kicked or otherwise physically hurt by someone?: No      Within the past 12 months, have you been humiliated or emotionally abused in other ways by your partner or ex-partner?: No   Housing Stability: Low Risk  (6/20/2024)    Housing Stability      Do you have housing? : Yes      Are you worried about losing your housing?: No        FAMILY HISTORY: History reviewed. No pertinent family history.     EXAM:Vitals: BP (!) 154/76 (BP Location: Left arm, Patient Position: Sitting, Cuff Size: Adult Regular)   Ht 1.803 m (5' 11\")   Wt 108.4 kg (239 lb)   BMI 33.33 kg/m    BMI= Body mass index is 33.33 kg/m .    General appearance: Patient is alert and fully cooperative with history & exam.  No sign of distress is noted during the visit.     Psychiatric: Affect is pleasant & appropriate.  Patient " appears motivated to improve health.     Respiratory: Breathing is regular & unlabored while sitting.     HEENT: Hearing is intact to spoken word.  Speech is clear.  No gross evidence of visual impairment that would impact ambulation.     Vascular: DP & PT pulses are intact & regular bilaterally.  No significant edema or varicosities noted.  CFT and skin temperature is normal to both lower extremities.     Neurologic: Lower extremity sensation is intact to light touch.  No evidence of weakness or contracture in the lower extremities.  No evidence of neuropathy.    Dermatologic: Skin is intact to both lower extremities with adequate texture, turgor and tone about the integument.  No paronychia or evidence of soft tissue infection is noted.     Musculoskeletal: Patient is ambulatory without assistive device or brace.  Gross edema circumferentially around the left ankle.  Pinpoint area of discomfort along the peroneal tendons and lateral left ankle.  But edema is circumferential around the left ankle joint.  There is limited range of motion about the left ankle.  There appears to be some guarding or weakness of the peroneal tendons.    Radiographs: 3 views of the left ankle demonstrate no acute fracture or joint dislocation.  No joint diastases.  No acute cortical reaction.  No loss of trabeculation through the talar dome.     ASSESSMENT:       ICD-10-CM    1. Peroneal tendonitis of left lower extremity  M76.72 MR Ankle Left w/o Contrast      2. Synovitis of left ankle  M65.9            PLAN:  Reviewed patient[facility] in [facility].      7/8/2024   Obtained and interpreted radiographs  Patient does have history of elevated uric acid  Recent joint aspiration was negative for crystals  Has continued pain and swelling limiting activity getting worse therefore recommended MRI of his left ankle.  Right knee was replaced 11/23, it has been more than 6 months so he should be able to have an MRI with low risk.   Follow-up after the  MRI to read this and discuss what treatment options are available and correlate clinically      Kj Bell DPM          Again, thank you for allowing me to participate in the care of your patient.        Sincerely,        Kj Bell DPM

## 2024-07-08 NOTE — PATIENT INSTRUCTIONS
Please call [phone] to schedule your MRI .     *Once your imaging exam has been scheduled, our prior authorization team will connect with your insurance to ensure coverage of the exam. They will only reach out to you if a prior authorization is denied.  Please schedule your imaging exam at least 7 days out so our team has time to complete this process.  Failure to do so could result in insurance denial and you becoming responsible for the cost of the exam.     After your imaging appointment is scheduled, call [phone] to schedule your 30 minute follow-up visit with Dr. Bell to discuss the results. .         Sturdy and reliable ankle braces are most readily available on line, ThinkGrid, or TAKO and delivered for around $15-60.  Health insurance often does not pay for this.    Consider:   Ease of application  Volume of the brace and will it fit in your shoes  Does the brace look like it will provide full circumference compression, which is needed if your ankle is swollen and less helpful for chronic deformities.     For recovery of an acute injury, avoid plastic stays on the side of the brace like the Aircast ankle stirrup as they are very bulky and do not fit in most shoes.  Avoid simple neoprene or compression sleeve only braces, as they do not provide much stability or resist re-injury during your recovery.    For acute or recent ankle injuries we often use an ankle brace for compression and stability and mostly to prevent minor re-injuries until the patient is able to regain strength and balance and able to perform one footed toe raise and one footed balance, equal bilateral.  Then discontinue its use as it can encourage the ankle to remain weak over many months.      For long term deformities and chronic instability the more rigid and thus bulkier braces are most often used to provide more help long term or help a ligament that has been elongated after multiple injuries.      Double ankle strap or support  is a good choice for acute ankle sprains or for compression, low volume and moderate stability.  Procare is one brand    Swede-O ankle brace   Procare lace up ankle brace - are both reasonable choices for long term support.  However they get a bit bulkier.  These are intended for longer term use.    ASO ankle brace.  They also consume the most volume in your shoes and may be harder to put on for some patients.

## 2024-07-08 NOTE — PROGRESS NOTES
HPI:  Frank Mace is a 63 year old male who is seen in consultation at the request of Sofia Colvin DO    Pt presents for eval of:   (Onset, Location, L/R, Character, Treatments, Injury if yes)    XR Left foot and ankle 7/8/2024    Labs June and July 2024     Onset April 2024, lateral Left foot pain while doing heel slide PT exercise. June 2024, posterior medial and lateral aspect of Left heel pain and swelling. Foot is improved but worse in the ankle/heel. Issues with Left knee pain w/aspiration at [facility]. Walks with a limp.    Wears orthotics. Carbon Fiber insert. Few weeks with tall cam boot. Wears [employer] shoes.  Improvement with 1 week of prednisone.    Works as a  at [employer].      ROS:  10 point ROS neg other than the symptoms noted above in the HPI.    Patient Active Problem List   Diagnosis    Arthrosis       PAST MEDICAL HISTORY: History reviewed. No pertinent past medical history.     PAST SURGICAL HISTORY: History reviewed. No pertinent surgical history.     MEDICATIONS:   Current Outpatient Medications:     amLODIPine (NORVASC) 5 MG tablet, Take 1 tablet (5 mg) by mouth daily, Disp: 90 tablet, Rfl: 3    amoxicillin (AMOXIL) 500 MG tablet, TAKE 4 TABLETS 1 HOUR BEFORE DENTAL APPOINTMENT. (Patient not taking: Reported on 6/20/2024), Disp: , Rfl:     atorvastatin (LIPITOR) 40 MG tablet, Take 1 tablet (40 mg) by mouth every evening, Disp: 90 tablet, Rfl: 3    empagliflozin (JARDIANCE) 10 MG TABS tablet, Take 1 tablet (10 mg) by mouth daily, Disp: 90 tablet, Rfl: 1    Ferrous Fumarate (IRON) 18 MG TBCR, , Disp: , Rfl:     Ferrous Sulfate (IRON) 28 MG TABS, , Disp: , Rfl:     hydrochlorothiazide (HYDRODIURIL) 25 MG tablet, Take 1 tablet (25 mg) by mouth daily, Disp: 90 tablet, Rfl: 3    losartan (COZAAR) 25 MG tablet, Take 1 tablet (25 mg) by mouth daily, Disp: 90 tablet, Rfl: 3    metoprolol succinate ER (TOPROL XL) 50 MG 24 hr tablet, Take 1 tablet (50 mg) by mouth daily,  Disp: 90 tablet, Rfl: 3    Multiple Vitamins-Minerals (MULTIVITAL PO), Take 1 tablet by mouth daily , Disp: , Rfl:     Vitamin D3 (CHOLECALCIFEROL) 25 mcg (1000 units) tablet, Take 1,000 Units by mouth, Disp: , Rfl:      ALLERGIES:    Allergies   Allergen Reactions    Codeine Nausea     And dizzy    Iodinated Contrast Media Other (See Comments)     Avoids for kidney disease    Lactose     Peanuts [Nuts] Other (See Comments)     Burps for days        SOCIAL HISTORY:   Social History     Socioeconomic History    Marital status:      Spouse name: Not on file    Number of children: Not on file    Years of education: Not on file    Highest education level: Not on file   Occupational History    Not on file   Tobacco Use    Smoking status: Never     Passive exposure: Never    Smokeless tobacco: Never   Vaping Use    Vaping status: Never Used   Substance and Sexual Activity    Alcohol use: Yes    Drug use: No    Sexual activity: Not on file   Other Topics Concern    Not on file   Social History Narrative    Not on file     Social Determinants of Health     Financial Resource Strain: Low Risk  (6/20/2024)    Financial Resource Strain     Within the past 12 months, have you or your family members you live with been unable to get utilities (heat, electricity) when it was really needed?: No   Food Insecurity: Low Risk  (6/20/2024)    Food Insecurity     Within the past 12 months, did you worry that your food would run out before you got money to buy more?: No     Within the past 12 months, did the food you bought just not last and you didn t have money to get more?: No   Transportation Needs: Low Risk  (6/20/2024)    Transportation Needs     Within the past 12 months, has lack of transportation kept you from medical appointments, getting your medicines, non-medical meetings or appointments, work, or from getting things that you need?: No   Physical Activity: Unknown (6/20/2024)    Exercise Vital Sign     Days of Exercise  "per Week: 0 days     Minutes of Exercise per Session: Not on file   Stress: Stress Concern Present (6/20/2024)    [place] [place] [employer] - Occupational Stress Questionnaire     Feeling of Stress : To some extent   Social Connections: Unknown (6/20/2024)    Social Connection and Isolation Panel [NHANES]     Frequency of Communication with Friends and Family: Not on file     Frequency of Social Gatherings with Friends and Family: Once a week     Attends Orthodox Services: Not on file     Active Member of Clubs or Organizations: Not on file     Attends Club or Organization Meetings: Not on file     Marital Status: Not on file   Interpersonal Safety: Low Risk  (6/20/2024)    Interpersonal Safety     Do you feel physically and emotionally safe where you currently live?: Yes     Within the past 12 months, have you been hit, slapped, kicked or otherwise physically hurt by someone?: No     Within the past 12 months, have you been humiliated or emotionally abused in other ways by your partner or ex-partner?: No   Housing Stability: Low Risk  (6/20/2024)    Housing Stability     Do you have housing? : Yes     Are you worried about losing your housing?: No        FAMILY HISTORY: History reviewed. No pertinent family history.     EXAM:Vitals: BP (!) 154/76 (BP Location: Left arm, Patient Position: Sitting, Cuff Size: Adult Regular)   Ht 1.803 m (5' 11\")   Wt 108.4 kg (239 lb)   BMI 33.33 kg/m    BMI= Body mass index is 33.33 kg/m .    General appearance: Patient is alert and fully cooperative with history & exam.  No sign of distress is noted during the visit.     Psychiatric: Affect is pleasant & appropriate.  Patient appears motivated to improve health.     Respiratory: Breathing is regular & unlabored while sitting.     HEENT: Hearing is intact to spoken word.  Speech is clear.  No gross evidence of visual impairment that would impact ambulation.     Vascular: DP & PT pulses are intact & regular " bilaterally.  No significant edema or varicosities noted.  CFT and skin temperature is normal to both lower extremities.     Neurologic: Lower extremity sensation is intact to light touch.  No evidence of weakness or contracture in the lower extremities.  No evidence of neuropathy.    Dermatologic: Skin is intact to both lower extremities with adequate texture, turgor and tone about the integument.  No paronychia or evidence of soft tissue infection is noted.     Musculoskeletal: Patient is ambulatory without assistive device or brace.  Gross edema circumferentially around the left ankle.  Pinpoint area of discomfort along the peroneal tendons and lateral left ankle.  But edema is circumferential around the left ankle joint.  There is limited range of motion about the left ankle.  There appears to be some guarding or weakness of the peroneal tendons.    Radiographs: 3 views of the left ankle demonstrate no acute fracture or joint dislocation.  No joint diastases.  No acute cortical reaction.  No loss of trabeculation through the talar dome.     ASSESSMENT:       ICD-10-CM    1. Peroneal tendonitis of left lower extremity  M76.72 MR Ankle Left w/o Contrast      2. Synovitis of left ankle  M65.9            PLAN:  Reviewed patient's chart in [facility].      7/8/2024   Obtained and interpreted radiographs  Patient does have history of elevated uric acid  Recent joint aspiration was negative for crystals  Has continued pain and swelling limiting activity getting worse therefore recommended MRI of his left ankle.  Right knee was replaced 11/23, it has been more than 6 months so he should be able to have an MRI with low risk.   Follow-up after the MRI to read this and discuss what treatment options are available and correlate clinically      Kj Bell DPM

## 2024-07-16 ENCOUNTER — LAB (OUTPATIENT)
Dept: LAB | Facility: CLINIC | Age: 64
End: 2024-07-16
Payer: COMMERCIAL

## 2024-07-16 DIAGNOSIS — N18.30 STAGE 3 CHRONIC KIDNEY DISEASE, UNSPECIFIED WHETHER STAGE 3A OR 3B CKD (H): ICD-10-CM

## 2024-07-16 DIAGNOSIS — E79.0 ELEVATED URIC ACID IN BLOOD: ICD-10-CM

## 2024-07-16 LAB
ANION GAP SERPL CALCULATED.3IONS-SCNC: 16 MMOL/L (ref 7–15)
BUN SERPL-MCNC: 54.1 MG/DL (ref 8–23)
CALCIUM SERPL-MCNC: 9.8 MG/DL (ref 8.8–10.4)
CHLORIDE SERPL-SCNC: 106 MMOL/L (ref 98–107)
CREAT SERPL-MCNC: 1.45 MG/DL (ref 0.67–1.17)
EGFRCR SERPLBLD CKD-EPI 2021: 54 ML/MIN/1.73M2
GLUCOSE SERPL-MCNC: 145 MG/DL (ref 70–99)
HCO3 SERPL-SCNC: 19 MMOL/L (ref 22–29)
LDH SERPL L TO P-CCNC: 173 U/L (ref 0–250)
POTASSIUM SERPL-SCNC: 4 MMOL/L (ref 3.4–5.3)
SODIUM SERPL-SCNC: 141 MMOL/L (ref 135–145)

## 2024-07-16 PROCEDURE — 80048 BASIC METABOLIC PNL TOTAL CA: CPT

## 2024-07-16 PROCEDURE — 36415 COLL VENOUS BLD VENIPUNCTURE: CPT

## 2024-07-16 PROCEDURE — 83615 LACTATE (LD) (LDH) ENZYME: CPT

## 2024-07-24 ENCOUNTER — HOSPITAL ENCOUNTER (OUTPATIENT)
Dept: MRI IMAGING | Facility: CLINIC | Age: 64
Discharge: HOME | End: 2024-07-24
Attending: PODIATRIST | Admitting: PODIATRIST
Payer: COMMERCIAL

## 2024-07-24 DIAGNOSIS — M76.72 PERONEAL TENDONITIS OF LEFT LOWER EXTREMITY: ICD-10-CM

## 2024-07-24 PROCEDURE — 73721 MRI JNT OF LWR EXTRE W/O DYE: CPT | Mod: LT

## 2024-07-25 ENCOUNTER — OFFICE VISIT (OUTPATIENT)
Dept: PODIATRY | Facility: CLINIC | Age: 64
End: 2024-07-25
Payer: COMMERCIAL

## 2024-07-25 VITALS
HEIGHT: 71 IN | DIASTOLIC BLOOD PRESSURE: 68 MMHG | BODY MASS INDEX: 33.32 KG/M2 | SYSTOLIC BLOOD PRESSURE: 148 MMHG | WEIGHT: 238 LBS

## 2024-07-25 DIAGNOSIS — M10.372 ACUTE GOUT DUE TO RENAL IMPAIRMENT INVOLVING LEFT FOOT: Primary | ICD-10-CM

## 2024-07-25 PROCEDURE — 99213 OFFICE O/P EST LOW 20 MIN: CPT | Performed by: PODIATRIST

## 2024-07-25 ASSESSMENT — PAIN SCALES - GENERAL: PAINLEVEL: NO PAIN (0)

## 2024-07-25 NOTE — LETTER
7/25/2024      Frank Mace  [address] [place] [address]      Dear Colleague,    Thank you for referring your patient, Frank Mace, to the [facility]. Please see a copy of my visit note below.    Chief Complaint   Patient presents with     Results     MRI Left ankle 7/24/2024     RECHECK     Left posterior tibial tendinitis, left ankle synovitis; LOV 7/8/2024     HPI:  Frank Mace is a 63 year old male who is seen in consultation at the request of Sofia Colvin DO    Pt presents for eval of:   (Onset, Location, L/R, Character, Treatments, Injury if yes)    XR Left foot and ankle 7/8/2024    Labs June and July 2024     Onset April 2024, lateral Left foot pain while doing heel slide PT exercise. June 2024, posterior medial and lateral aspect of Left heel pain and swelling. Foot is improved but worse in the ankle/heel. Issues with Left knee pain w/aspiration at [facility]. Walks with a limp.    Wears orthotics. Carbon Fiber insert. Few weeks with tall cam boot. Wears [employer] house shoes.  Improvement with 1 week of prednisone.    Works as a  at [employer].      Since last visit patient is doing quite well as long as he wears compression or an ankle brace or compression sock.    ROS:  10 point ROS neg other than the symptoms noted above in the HPI.    Patient Active Problem List   Diagnosis     Arthrosis       PAST MEDICAL HISTORY: History reviewed. No pertinent past medical history.     PAST SURGICAL HISTORY: History reviewed. No pertinent surgical history.     MEDICATIONS:   Current Outpatient Medications:      amLODIPine (NORVASC) 5 MG tablet, Take 1 tablet (5 mg) by mouth daily, Disp: 90 tablet, Rfl: 3     atorvastatin (LIPITOR) 40 MG tablet, Take 1 tablet (40 mg) by mouth every evening, Disp: 90 tablet, Rfl: 3     empagliflozin (JARDIANCE) 10 MG TABS tablet, Take 1 tablet (10 mg) by mouth daily, Disp: 90 tablet, Rfl: 1     Ferrous Fumarate (IRON)  18 MG TBCR, , Disp: , Rfl:      Ferrous Sulfate (IRON) 28 MG TABS, , Disp: , Rfl:      hydrochlorothiazide (HYDRODIURIL) 25 MG tablet, Take 1 tablet (25 mg) by mouth daily, Disp: 90 tablet, Rfl: 3     losartan (COZAAR) 25 MG tablet, Take 1 tablet (25 mg) by mouth daily, Disp: 90 tablet, Rfl: 3     metoprolol succinate ER (TOPROL XL) 50 MG 24 hr tablet, Take 1 tablet (50 mg) by mouth daily, Disp: 90 tablet, Rfl: 3     Multiple Vitamins-Minerals (MULTIVITAL PO), Take 1 tablet by mouth daily , Disp: , Rfl:      Vitamin D3 (CHOLECALCIFEROL) 25 mcg (1000 units) tablet, Take 1,000 Units by mouth, Disp: , Rfl:      amoxicillin (AMOXIL) 500 MG tablet, TAKE 4 TABLETS 1 HOUR BEFORE DENTAL APPOINTMENT. (Patient not taking: Reported on 6/20/2024), Disp: , Rfl:      ALLERGIES:    Allergies   Allergen Reactions     Codeine Nausea     And dizzy     Iodinated Contrast Media Other (See Comments)     Avoids for kidney disease     Lactose      Peanuts [Nuts] Other (See Comments)     Burps for days        SOCIAL HISTORY:   Social History     Socioeconomic History     Marital status:      Spouse name: Not on file     Number of children: Not on file     Years of education: Not on file     Highest education level: Not on file   Occupational History     Not on file   Tobacco Use     Smoking status: Never     Passive exposure: Never     Smokeless tobacco: Never   Vaping Use     Vaping status: Never Used   Substance and Sexual Activity     Alcohol use: Yes     Drug use: No     Sexual activity: Not on file   Other Topics Concern     Not on file   Social History Narrative     Not on file     Social Determinants of Health     Financial Resource Strain: Low Risk  (6/20/2024)    Financial Resource Strain      Within the past 12 months, have you or your family members you live with been unable to get utilities (heat, electricity) when it was really needed?: No   Food Insecurity: Low Risk  (6/20/2024)    Food Insecurity      Within the past  12 months, did you worry that your food would run out before you got money to buy more?: No      Within the past 12 months, did the food you bought just not last and you didn t have money to get more?: No   Transportation Needs: Low Risk  (6/20/2024)    Transportation Needs      Within the past 12 months, has lack of transportation kept you from medical appointments, getting your medicines, non-medical meetings or appointments, work, or from getting things that you need?: No   Physical Activity: Unknown (6/20/2024)    Exercise Vital Sign      Days of Exercise per Week: 0 days      Minutes of Exercise per Session: Not on file   Stress: Stress Concern Present (6/20/2024)    [employer] [place] [employer] - Occupational Stress Questionnaire      Feeling of Stress : To some extent   Social Connections: Unknown (6/20/2024)    Social Connection and Isolation Panel [NHANES]      Frequency of Communication with Friends and Family: Not on file      Frequency of Social Gatherings with Friends and Family: Once a week      Attends Restorationism Services: Not on file      Active Member of Clubs or Organizations: Not on file      Attends Club or Organization Meetings: Not on file      Marital Status: Not on file   Interpersonal Safety: Low Risk  (6/20/2024)    Interpersonal Safety      Do you feel physically and emotionally safe where you currently live?: Yes      Within the past 12 months, have you been hit, slapped, kicked or otherwise physically hurt by someone?: No      Within the past 12 months, have you been humiliated or emotionally abused in other ways by your partner or ex-partner?: No   Housing Stability: Low Risk  (6/20/2024)    Housing Stability      Do you have housing? : Yes      Are you worried about losing your housing?: No        FAMILY HISTORY: History reviewed. No pertinent family history.     EXAM:Vitals: BP (!) 148/68 (BP Location: Left arm, Patient Position: Sitting, Cuff Size: Adult Large)   Ht 1.803  "m (5' 11\")   Wt 108 kg (238 lb)   BMI 33.19 kg/m    BMI= Body mass index is 33.19 kg/m .    General appearance: Patient is alert and fully cooperative with history & exam.  No sign of distress is noted during the visit.     Psychiatric: Affect is pleasant & appropriate.  Patient appears motivated to improve health.     Respiratory: Breathing is regular & unlabored while sitting.     HEENT: Hearing is intact to spoken word.  Speech is clear.  No gross evidence of visual impairment that would impact ambulation.     Vascular: DP & PT pulses are intact & regular bilaterally.  No significant edema or varicosities noted.  CFT and skin temperature is normal to both lower extremities.     Neurologic: Lower extremity sensation is intact to light touch.  No evidence of weakness or contracture in the lower extremities.  No evidence of neuropathy.    Dermatologic: Skin is intact to both lower extremities with adequate texture, turgor and tone about the integument.  No paronychia or evidence of soft tissue infection is noted.     Musculoskeletal: Patient is ambulatory without assistive device or brace.  Gross edema circumferentially around the left ankle.  Pinpoint area of discomfort along the peroneal tendons and lateral left ankle.  But edema is circumferential around the left ankle joint.  There is limited range of motion about the left ankle.  There appears to be some guarding or weakness of the peroneal tendons.    Radiographs: 3 views of the left ankle demonstrate no acute fracture or joint dislocation.  No joint diastases.  No acute cortical reaction.  No loss of trabeculation through the talar dome.    Labs:   Uric acid 6/20/2024 elevated at 10.0  7/16/2024 elevated urea nitrogen, elevated creatinine, diminished GFR, elevated glucose    MRI 7/20/2024  Very mild tenosynovitis of the PT and peroneals.  Very mild degeneration of the posterior subtalar joint.     ASSESSMENT:       ICD-10-CM    1. Acute gout due to renal " impairment involving left foot  M10.372              PLAN:  Reviewed patient's chart in [facility].      7/8/2024   Obtained and interpreted radiographs  Patient does have history of elevated uric acid  Recent joint aspiration was negative for crystals  Has continued pain and swelling limiting activity getting worse therefore recommended MRI of his left ankle.  Right knee was replaced 11/23, it has been more than 6 months so he should be able to have an MRI with low risk.   Follow-up after the MRI to read this and discuss what treatment options are available and correlate clinically    7/26/2024  Patient does have renal dysfunction follows with a nephrologist and is likely experience off-and-on symptoms of gout.  He does have a history of negative crystals in his knee joint at one moment in time but was not having symptoms of gout at that time.  The symptoms that his foot and ankle are likely associated with gout.  Recommended a lower purine diet and follow-up with nephrologist.  Also discussed and offered follow-up nutrition consult to better understand where appearing comes from.  May also discuss this with rheumatologist for long-term management.  All questions were answered.  Follow-up as needed.      Kj Bell DPM              Again, thank you for allowing me to participate in the care of your patient.        Sincerely,        Kj Bell DPM

## 2024-08-20 DIAGNOSIS — M10.9 GOUT, UNSPECIFIED: ICD-10-CM

## 2024-08-20 DIAGNOSIS — N18.31 CHRONIC KIDNEY DISEASE (CKD) STAGE G3A/A1, MODERATELY DECREASED GLOMERULAR FILTRATION RATE (GFR) BETWEEN 45-59 ML/MIN/1.73 SQUARE METER AND ALBUMINURIA CREATININE RATIO LESS THAN 30 MG/G (H): Primary | ICD-10-CM

## 2024-09-04 DIAGNOSIS — E11.9 TYPE 2 DIABETES MELLITUS WITHOUT COMPLICATIONS (H): ICD-10-CM

## 2024-09-06 RX ORDER — GLIPIZIDE 5 MG/1
TABLET, FILM COATED, EXTENDED RELEASE ORAL
Qty: 90 TABLET | Refills: 0 | OUTPATIENT
Start: 2024-09-06

## 2024-09-06 NOTE — TELEPHONE ENCOUNTER
7/21/24 Dr. Colvin office visit:  Type 2 diabetes mellitus with stage 3 chronic kidney disease, without long-term current use of insulin, unspecified whether stage 3a or 3b CKD (H)  Plan to transition off of glipizide and to jardiance for better benefits for his CKD-follow up in1 month for repeat bmp. Adverse effects reviewed  - empagliflozin (JARDIANCE) 10 MG TABS tablet  Dispense: 90 tablet; Refill: 1    Dana Hi RN on 9/6/2024 at 10:02 AM

## 2024-09-17 ENCOUNTER — LAB (OUTPATIENT)
Dept: LAB | Facility: CLINIC | Age: 64
End: 2024-09-17
Payer: COMMERCIAL

## 2024-09-17 DIAGNOSIS — N18.31 CHRONIC KIDNEY DISEASE (CKD) STAGE G3A/A1, MODERATELY DECREASED GLOMERULAR FILTRATION RATE (GFR) BETWEEN 45-59 ML/MIN/1.73 SQUARE METER AND ALBUMINURIA CREATININE RATIO LESS THAN 30 MG/G (H): ICD-10-CM

## 2024-09-17 DIAGNOSIS — M10.9 GOUT, UNSPECIFIED: ICD-10-CM

## 2024-09-17 LAB
ALBUMIN SERPL BCG-MCNC: 4.4 G/DL (ref 3.5–5.2)
ALBUMIN UR-MCNC: >=300 MG/DL
ANION GAP SERPL CALCULATED.3IONS-SCNC: 15 MMOL/L (ref 7–15)
APPEARANCE UR: CLEAR
BACTERIA #/AREA URNS HPF: ABNORMAL /HPF
BILIRUB UR QL STRIP: NEGATIVE
BUN SERPL-MCNC: 46.7 MG/DL (ref 8–23)
CALCIUM SERPL-MCNC: 9.6 MG/DL (ref 8.8–10.4)
CHLORIDE SERPL-SCNC: 108 MMOL/L (ref 98–107)
COLOR UR AUTO: YELLOW
CREAT SERPL-MCNC: 1.58 MG/DL (ref 0.67–1.17)
EGFRCR SERPLBLD CKD-EPI 2021: 49 ML/MIN/1.73M2
GLUCOSE SERPL-MCNC: 172 MG/DL (ref 70–99)
GLUCOSE UR STRIP-MCNC: >=1000 MG/DL
HCO3 SERPL-SCNC: 21 MMOL/L (ref 22–29)
HGB BLD-MCNC: 12.2 G/DL (ref 13.3–17.7)
HGB UR QL STRIP: ABNORMAL
KETONES UR STRIP-MCNC: NEGATIVE MG/DL
LEUKOCYTE ESTERASE UR QL STRIP: NEGATIVE
NITRATE UR QL: NEGATIVE
PH UR STRIP: 5 [PH] (ref 5–7)
PHOSPHATE SERPL-MCNC: 4.4 MG/DL (ref 2.5–4.5)
POTASSIUM SERPL-SCNC: 4.4 MMOL/L (ref 3.4–5.3)
RBC #/AREA URNS AUTO: ABNORMAL /HPF
SODIUM SERPL-SCNC: 144 MMOL/L (ref 135–145)
SP GR UR STRIP: 1.02 (ref 1–1.03)
SQUAMOUS #/AREA URNS AUTO: ABNORMAL /LPF
URATE SERPL-MCNC: 10.4 MG/DL (ref 3.4–7)
UROBILINOGEN UR STRIP-ACNC: 0.2 E.U./DL
WBC #/AREA URNS AUTO: ABNORMAL /HPF

## 2024-09-17 PROCEDURE — 85018 HEMOGLOBIN: CPT

## 2024-09-17 PROCEDURE — 81001 URINALYSIS AUTO W/SCOPE: CPT

## 2024-09-17 PROCEDURE — 80069 RENAL FUNCTION PANEL: CPT

## 2024-09-17 PROCEDURE — 84550 ASSAY OF BLOOD/URIC ACID: CPT

## 2024-09-17 PROCEDURE — 36415 COLL VENOUS BLD VENIPUNCTURE: CPT

## 2024-09-24 ENCOUNTER — TELEPHONE (OUTPATIENT)
Dept: FAMILY MEDICINE | Facility: CLINIC | Age: 64
End: 2024-09-24
Payer: COMMERCIAL

## 2024-09-24 NOTE — TELEPHONE ENCOUNTER
RN called patient     [facility] November had total knee replace     Wants to work on balance and strength    Right shoulder and Left shoulder have been painful for the past year.      RN scheduled a same day appointment with Dr. Colvin 9/25/24 to address bilateral should pain, right knee, and questions about restarting glipizide.     Dana Hi RN on 9/24/2024 at 10:39 AM

## 2024-09-24 NOTE — TELEPHONE ENCOUNTER
Order/Referral Request    Who is requesting: patient     Orders being requested: physical therapy     Reason service is needed/diagnosis: knee and shoulders     When are orders needed by: soon    Has this been discussed with Provider: Yes    Does patient have a preference on a Group/Provider/Facility? [facility]     Does patient have an appointment scheduled?: No    Where to send orders: Place orders within Epic    Could we send this information to you in [facility] or would you prefer to receive a phone call?:   Patient would prefer a phone call   Okay to leave a detailed message?: Yes at Cell number on file:    Telephone Information:   Mobile [phone]

## 2024-09-25 ENCOUNTER — OFFICE VISIT (OUTPATIENT)
Dept: FAMILY MEDICINE | Facility: CLINIC | Age: 64
End: 2024-09-25
Payer: COMMERCIAL

## 2024-09-25 VITALS
WEIGHT: 241 LBS | HEIGHT: 71 IN | DIASTOLIC BLOOD PRESSURE: 74 MMHG | SYSTOLIC BLOOD PRESSURE: 156 MMHG | TEMPERATURE: 98.2 F | HEART RATE: 64 BPM | OXYGEN SATURATION: 98 % | BODY MASS INDEX: 33.74 KG/M2 | RESPIRATION RATE: 16 BRPM

## 2024-09-25 DIAGNOSIS — Z12.11 COLON CANCER SCREENING: ICD-10-CM

## 2024-09-25 DIAGNOSIS — E11.22 TYPE 2 DIABETES MELLITUS WITH STAGE 3 CHRONIC KIDNEY DISEASE, WITHOUT LONG-TERM CURRENT USE OF INSULIN, UNSPECIFIED WHETHER STAGE 3A OR 3B CKD (H): ICD-10-CM

## 2024-09-25 DIAGNOSIS — D47.2 MGUS (MONOCLONAL GAMMOPATHY OF UNKNOWN SIGNIFICANCE): ICD-10-CM

## 2024-09-25 DIAGNOSIS — N18.30 TYPE 2 DIABETES MELLITUS WITH STAGE 3 CHRONIC KIDNEY DISEASE, WITHOUT LONG-TERM CURRENT USE OF INSULIN, UNSPECIFIED WHETHER STAGE 3A OR 3B CKD (H): ICD-10-CM

## 2024-09-25 DIAGNOSIS — N18.30 STAGE 3 CHRONIC KIDNEY DISEASE, UNSPECIFIED WHETHER STAGE 3A OR 3B CKD (H): ICD-10-CM

## 2024-09-25 DIAGNOSIS — R63.5 ABNORMAL WEIGHT GAIN: Primary | ICD-10-CM

## 2024-09-25 DIAGNOSIS — R53.83 OTHER FATIGUE: ICD-10-CM

## 2024-09-25 DIAGNOSIS — M25.512 CHRONIC PAIN OF BOTH SHOULDERS: ICD-10-CM

## 2024-09-25 DIAGNOSIS — M25.511 CHRONIC PAIN OF BOTH SHOULDERS: ICD-10-CM

## 2024-09-25 DIAGNOSIS — R10.13 EPIGASTRIC PAIN: ICD-10-CM

## 2024-09-25 DIAGNOSIS — G89.29 CHRONIC PAIN OF BOTH SHOULDERS: ICD-10-CM

## 2024-09-25 DIAGNOSIS — M25.561 CHRONIC PAIN OF RIGHT KNEE: ICD-10-CM

## 2024-09-25 DIAGNOSIS — M10.9 GOUT, UNSPECIFIED CAUSE, UNSPECIFIED CHRONICITY, UNSPECIFIED SITE: ICD-10-CM

## 2024-09-25 DIAGNOSIS — N18.31 CHRONIC KIDNEY DISEASE (CKD) STAGE G3A/A1, MODERATELY DECREASED GLOMERULAR FILTRATION RATE (GFR) BETWEEN 45-59 ML/MIN/1.73 SQUARE METER AND ALBUMINURIA CREATININE RATIO LESS THAN 30 MG/G (H): Primary | ICD-10-CM

## 2024-09-25 DIAGNOSIS — I10 BENIGN ESSENTIAL HYPERTENSION: ICD-10-CM

## 2024-09-25 DIAGNOSIS — G89.29 CHRONIC PAIN OF RIGHT KNEE: ICD-10-CM

## 2024-09-25 PROCEDURE — 99214 OFFICE O/P EST MOD 30 MIN: CPT | Performed by: FAMILY MEDICINE

## 2024-09-25 RX ORDER — ALLOPURINOL 100 MG/1
50 TABLET ORAL 2 TIMES DAILY
COMMUNITY
Start: 2024-09-25

## 2024-09-25 RX ORDER — ATORVASTATIN CALCIUM 40 MG/1
40 TABLET, FILM COATED ORAL EVERY EVENING
Qty: 90 TABLET | Refills: 3 | Status: CANCELLED | OUTPATIENT
Start: 2024-09-25

## 2024-09-25 NOTE — PROGRESS NOTES
Assessment & Plan     Chronic kidney disease (CKD) stage G3a/A1, moderately decreased glomerular filtration rate (GFR) between 45-59 mL/min/1.73 square meter and albuminuria creatinine ratio less than 30 mg/g (H)  Following mila Doss-nephrology  - Albumin Random Urine Quantitative with Creat Ratio    Type 2 diabetes mellitus with stage 3 chronic kidney disease, without long-term current use of insulin, unspecified whether stage 3a or 3b CKD (H)  HE restrated glipizide, however after discussion would prefer to optimize his jardiacne. Recommend increase this, recheck A1c and hold glipizide. Referral to diabteic ed  - HEMOGLOBIN A1C  - Adult Diabetes Education  Referral  - empagliflozin (JARDIANCE) 25 MG TABS tablet  Dispense: 90 tablet; Refill: 0    Stage 3 chronic kidney disease, unspecified whether stage 3a or 3b CKD (H)  - empagliflozin (JARDIANCE) 25 MG TABS tablet  Dispense: 90 tablet; Refill: 0    Other fatigue  Concern for EVELIN-recommend meeting with sleep  - Adult Sleep Eval & Management  Referral    Chronic pain of right knee  HX of knee replacement, some ongong stiffness  - Physical Therapy  Referral    Chronic pain of both shoulders  ? Of roataor cuff dysfunction, reocmmend PT  - Physical Therapy  Referral    Epigastric pain  - Adult GI  Referral - Procedure Only    Colon cancer screening  - Colonoscopy Screening  Referral    MGUS (monoclonal gammopathy of unknown significance)  Follows with oncolog    Gout, unspecified cause, unspecified chronicity, unspecified site  Started on allopurinol by nephrology       Benign essential hypertension  Managed by nephrology-recently increased his losartan       Mathew Marie is a 64 year old, presenting for the following health issues:  Referral        9/25/2024     9:47 AM   Additional Questions   Roomed by Kecia SOLIS MA   Accompanied by Self     Via the Health Maintenance questionnaire, the patient has  "reported the following services have been completed -Eye Exam: dr renal[facility] 2023-10-18, this information has been sent to the abstraction team.  History of Present Illness       Reason for visit:  Referal        Referral-  Would like a PT referral for knees and shoulders    Losartan increased to 50  Allopurinol 50 mg    154/64 at the nephrologist visit.    Notices today that his feet are a little swollen.    Wondering if he could add glipizide back on    Seeing urology-lower urinary symptoms, up every 2 hours a at night to go to the bathroom.       Review of Systems  Constitutional, HEENT, cardiovascular, pulmonary, gi and gu systems are negative, except as otherwise noted.      Objective    BP (!) 156/74   Pulse 64   Temp 98.2  F (36.8  C)   Resp 16   Ht 1.803 m (5' 11\")   Wt 109.3 kg (241 lb)   SpO2 98%   BMI 33.61 kg/m    Body mass index is 33.61 kg/m .  Physical Exam  Constitutional:       Appearance: Normal appearance.   HENT:      Head: Normocephalic.      Right Ear: Tympanic membrane normal.      Left Ear: Tympanic membrane normal.      Mouth/Throat:      Mouth: Mucous membranes are moist.   Eyes:      Conjunctiva/sclera: Conjunctivae normal.   Cardiovascular:      Rate and Rhythm: Normal rate and regular rhythm.   Pulmonary:      Effort: Pulmonary effort is normal.   Abdominal:      General: Bowel sounds are normal.   Musculoskeletal:      Right lower leg: No edema.      Left lower leg: No edema.   Skin:     General: Skin is warm and dry.   Neurological:      Mental Status: He is alert.   Psychiatric:         Mood and Affect: Mood normal.         Thought Content: Thought content normal.                Signed Electronically by: JUNE ALDRICH DO    "

## 2024-09-25 NOTE — PATIENT INSTRUCTIONS
Sleep apnea testing    PT    Follow up with urology    Follow up with nephrology    Increase Jardiance to 25 mg (A1c next week with your other labs), see Diabetic ed, STOP glipizide    Colonoscopy/EGD

## 2024-09-30 ENCOUNTER — TELEPHONE (OUTPATIENT)
Dept: FAMILY MEDICINE | Facility: CLINIC | Age: 64
End: 2024-09-30
Payer: COMMERCIAL

## 2024-09-30 DIAGNOSIS — G89.29 CHRONIC PAIN OF BOTH SHOULDERS: Primary | ICD-10-CM

## 2024-09-30 DIAGNOSIS — M25.512 CHRONIC PAIN OF BOTH SHOULDERS: Primary | ICD-10-CM

## 2024-09-30 DIAGNOSIS — M25.511 CHRONIC PAIN OF BOTH SHOULDERS: Primary | ICD-10-CM

## 2024-09-30 NOTE — TELEPHONE ENCOUNTER
----- Message from Abiola VILLANUEVA sent at 9/30/2024  7:41 AM CDT -----  Regarding: phi  Hi,  You just saw this patient and referred for phyiscal therpay for knees and shoulders.  Can you create a second referral for the shoulders. We need a separate eval for each body part.  I was able to schedule patient for the 2 evals but need to attach a referral to the shoulders. thanks

## 2024-10-01 ENCOUNTER — THERAPY VISIT (OUTPATIENT)
Dept: PHYSICAL THERAPY | Facility: CLINIC | Age: 64
End: 2024-10-01
Attending: FAMILY MEDICINE
Payer: COMMERCIAL

## 2024-10-01 DIAGNOSIS — M25.561 CHRONIC PAIN OF RIGHT KNEE: ICD-10-CM

## 2024-10-01 DIAGNOSIS — M25.512 CHRONIC PAIN OF BOTH SHOULDERS: ICD-10-CM

## 2024-10-01 DIAGNOSIS — M25.511 CHRONIC PAIN OF BOTH SHOULDERS: ICD-10-CM

## 2024-10-01 DIAGNOSIS — G89.29 CHRONIC PAIN OF BOTH SHOULDERS: ICD-10-CM

## 2024-10-01 DIAGNOSIS — G89.29 CHRONIC PAIN OF RIGHT KNEE: ICD-10-CM

## 2024-10-01 PROCEDURE — 97110 THERAPEUTIC EXERCISES: CPT | Mod: GP | Performed by: PHYSICAL THERAPIST

## 2024-10-01 PROCEDURE — 97161 PT EVAL LOW COMPLEX 20 MIN: CPT | Mod: GP | Performed by: PHYSICAL THERAPIST

## 2024-10-01 NOTE — PROGRESS NOTES
PHYSICAL THERAPY EVALUATION  Type of Visit: Evaluation              Subjective       Presenting condition or subjective complaint:  LE weakness R>L especially down stairs; B shoulder pain R>L with lifting and R reaching behind back  Date of onset: 09/25/24    Relevant medical history:   R TKA Nov 2023; R shoulder pain when lifting item off car seat several years ago pain intermittent; other PMH:  Obesity; HTN, DM2; h/o concussion and dizziness, kidney disease; likely sleep apnea awaiting sleep study    Prior diagnostic imaging/testing results:       Prior therapy history for the same diagnosis, illness or injury:    after TKA stopped doing exercises    Prior Level of Function  Transfers: Independent  Ambulation: Independent  guarded initially  ADL: Independent  pain with reaching behind back    Employment:  works as       Patient goals for therapy:  get stronger, decrease pain; better on stairs    Pain assessment: shoulder pain 6/10 baseline 9/10 brief with moving or      Objective     KNEE EVALUATION  PAIN: weakness >pain   GAIT:independent no assistive device, guarded initially  BALANCE/PROPRIOCEPTION:  SLS 1-2 sec  WEIGHTBEARING ALIGNMENT: WFL  ROM: AROM 0-115 in supine; PROM 0-90* prone  STRENGTH: 4/5 R knee  FLEXIBILITY: tight HF/Q in prone  FUNCTIONAL TESTS: weakness descending steps B  PALPATION: unremarkable    SHOULDER EVALUATION  PAIN: Pain Level at Rest: 6/10  Pain Level with Use: 9/10  INTEGUMENTARY (edema, incisions): unremarkable  POSTURE: rounded shoulders, forward head  ROM: full AROM  except R behind back to iliac crest only  Pain: painful arc at 90* flx or abd; pain at endrange flxn  End feel: soft    STRENGTH:     FLEXIBILITY:   SPECIAL TESTS:    Left Right   Impingement     Neer's  +   Hawkin's-Omi  +                                                                              Rotator Cuff Tear     Drop Arm  -   Belly Press  -   Rotator Cuff Tendonopathy      Empty can   -     Full Can    -               PALPATION:   + Tenderness At Location Left Right   Supraspinatus - -   Infraspinatus - -   Upper trap - -   Bicipital groove - -       CERVICAL SCREEN: WFL        Assessment & Plan   CLINICAL IMPRESSIONS  Medical Diagnosis: chronic R knee pain; chronic B shoulder pain    Treatment Diagnosis: chronic R knee pain; chronic B shoulder pain   Impression/Assessment: Patient is a 64 year old male with R knee pain and weakness complaints; B shoulder R>L pain and weakness with limited IR R.  The following significant findings have been identified: Pain, Decreased ROM/flexibility, Decreased strength, Impaired balance, and Impaired gait. These impairments interfere with their ability to perform self care tasks, work tasks, recreational activities, and household chores as compared to previous level of function.     Clinical Decision Making (Complexity):  Clinical Presentation: Stable/Uncomplicated  Clinical Presentation Rationale: based on medical and personal factors listed in PT evaluation  Clinical Decision Making (Complexity): Low complexity    PLAN OF CARE  Treatment Interventions:  Interventions: Gait Training, Manual Therapy, Neuromuscular Re-education, Therapeutic Activity, Therapeutic Exercise, Self-Care/Home Management    Long Term Goals     PT Goal 1  Goal Identifier: knee  Goal Description: up/down full flight of stairs reciprocal pattern no rail to carry items in/out of house  Rationale: to maximize safety and independence with performance of ADLs and functional tasks  Target Date: 12/13/24  PT Goal 2  Goal Identifier: knee and shoulder  Goal Description: Independent HEP to continue strengthening and stretching on own  Rationale: to maximize safety and independence with performance of ADLs and functional tasks  Target Date: 12/13/24  PT Goal 3  Goal Identifier: shoulder  Goal Description: decrease pain and improve AROM R shoulder to allow tucking shirt in or threading belt in pants  Rationale: to  maximize safety and independence with performance of ADLs and functional tasks  Target Date: 12/13/24  PT Goal 4  Goal Identifier: shoulder  Goal Description: Improve strength to allow lifting 10# with little or no pain  Rationale: to maximize safety and independence with performance of ADLs and functional tasks  Target Date: 12/13/24      Frequency of Treatment: 1x/wk  Duration of Treatment: 10 weeks plus 4 days      Education Assessment:   Learner/Method: Patient;Listening;Reading;Demonstration;Pictures/Video  Education Comments: PTRx    Risks and benefits of evaluation/treatment have been explained.   Patient/Family/caregiver agrees with Plan of Care.     Evaluation Time:     PT Eval, Low Complexity Minutes (87025): 20       Signing Clinician: Eva Elizabeth, PT DPT

## 2024-10-02 ENCOUNTER — TELEPHONE (OUTPATIENT)
Dept: FAMILY MEDICINE | Facility: CLINIC | Age: 64
End: 2024-10-02
Payer: COMMERCIAL

## 2024-10-02 NOTE — TELEPHONE ENCOUNTER
Patient Quality Outreach    Patient is due for the following:   Hypertension -  BP check    Next Steps:   Schedule a nurse only visit for BP    Type of outreach:    Chart review performed, no outreach needed.      Questions for provider review:    None           Kecia Piña, CMA

## 2024-10-08 ENCOUNTER — LAB (OUTPATIENT)
Dept: LAB | Facility: CLINIC | Age: 64
End: 2024-10-08
Payer: COMMERCIAL

## 2024-10-08 ENCOUNTER — THERAPY VISIT (OUTPATIENT)
Dept: PHYSICAL THERAPY | Facility: CLINIC | Age: 64
End: 2024-10-08
Attending: FAMILY MEDICINE
Payer: COMMERCIAL

## 2024-10-08 ENCOUNTER — IMMUNIZATION (OUTPATIENT)
Dept: FAMILY MEDICINE | Facility: CLINIC | Age: 64
End: 2024-10-08
Payer: COMMERCIAL

## 2024-10-08 ENCOUNTER — ALLIED HEALTH/NURSE VISIT (OUTPATIENT)
Dept: FAMILY MEDICINE | Facility: CLINIC | Age: 64
End: 2024-10-08
Payer: COMMERCIAL

## 2024-10-08 VITALS — SYSTOLIC BLOOD PRESSURE: 136 MMHG | HEART RATE: 66 BPM | DIASTOLIC BLOOD PRESSURE: 78 MMHG

## 2024-10-08 DIAGNOSIS — N18.31 CHRONIC KIDNEY DISEASE (CKD) STAGE G3A/A1, MODERATELY DECREASED GLOMERULAR FILTRATION RATE (GFR) BETWEEN 45-59 ML/MIN/1.73 SQUARE METER AND ALBUMINURIA CREATININE RATIO LESS THAN 30 MG/G (H): ICD-10-CM

## 2024-10-08 DIAGNOSIS — M25.512 CHRONIC PAIN OF BOTH SHOULDERS: ICD-10-CM

## 2024-10-08 DIAGNOSIS — R63.5 ABNORMAL WEIGHT GAIN: ICD-10-CM

## 2024-10-08 DIAGNOSIS — Z01.30 BLOOD PRESSURE CHECK: Primary | ICD-10-CM

## 2024-10-08 DIAGNOSIS — E11.22 TYPE 2 DIABETES MELLITUS WITH STAGE 3 CHRONIC KIDNEY DISEASE, WITHOUT LONG-TERM CURRENT USE OF INSULIN, UNSPECIFIED WHETHER STAGE 3A OR 3B CKD (H): ICD-10-CM

## 2024-10-08 DIAGNOSIS — N18.30 TYPE 2 DIABETES MELLITUS WITH STAGE 3 CHRONIC KIDNEY DISEASE, WITHOUT LONG-TERM CURRENT USE OF INSULIN, UNSPECIFIED WHETHER STAGE 3A OR 3B CKD (H): ICD-10-CM

## 2024-10-08 DIAGNOSIS — G89.29 CHRONIC PAIN OF BOTH SHOULDERS: ICD-10-CM

## 2024-10-08 DIAGNOSIS — M25.511 CHRONIC PAIN OF BOTH SHOULDERS: ICD-10-CM

## 2024-10-08 DIAGNOSIS — G89.29 CHRONIC PAIN OF RIGHT KNEE: Primary | ICD-10-CM

## 2024-10-08 DIAGNOSIS — M25.561 CHRONIC PAIN OF RIGHT KNEE: Primary | ICD-10-CM

## 2024-10-08 LAB
CREAT UR-MCNC: 71.5 MG/DL
EST. AVERAGE GLUCOSE BLD GHB EST-MCNC: 123 MG/DL
HBA1C MFR BLD: 5.9 % (ref 0–5.6)
MICROALBUMIN UR-MCNC: 849.4 MG/L
MICROALBUMIN/CREAT UR: 1187.97 MG/G CR (ref 0–17)
TSH SERPL DL<=0.005 MIU/L-ACNC: 1.89 UIU/ML (ref 0.3–4.2)

## 2024-10-08 PROCEDURE — 36415 COLL VENOUS BLD VENIPUNCTURE: CPT

## 2024-10-08 PROCEDURE — 84443 ASSAY THYROID STIM HORMONE: CPT

## 2024-10-08 PROCEDURE — 97110 THERAPEUTIC EXERCISES: CPT | Mod: GP | Performed by: PHYSICAL THERAPIST

## 2024-10-08 PROCEDURE — 82570 ASSAY OF URINE CREATININE: CPT

## 2024-10-08 PROCEDURE — 90471 IMMUNIZATION ADMIN: CPT

## 2024-10-08 PROCEDURE — 90673 RIV3 VACCINE NO PRESERV IM: CPT

## 2024-10-08 PROCEDURE — 83036 HEMOGLOBIN GLYCOSYLATED A1C: CPT

## 2024-10-08 PROCEDURE — 99207 PR NO CHARGE NURSE ONLY: CPT

## 2024-10-08 PROCEDURE — 82043 UR ALBUMIN QUANTITATIVE: CPT

## 2024-10-08 NOTE — NURSING NOTE
Frank presents to clinic for a blood pressure check. Today's reading is 136/78 with pulse of 66.    Rekha Charlton RN

## 2024-10-11 ENCOUNTER — TRANSFERRED RECORDS (OUTPATIENT)
Dept: HEALTH INFORMATION MANAGEMENT | Facility: CLINIC | Age: 64
End: 2024-10-11
Payer: COMMERCIAL

## 2024-10-15 ENCOUNTER — THERAPY VISIT (OUTPATIENT)
Dept: PHYSICAL THERAPY | Facility: CLINIC | Age: 64
End: 2024-10-15
Attending: FAMILY MEDICINE
Payer: COMMERCIAL

## 2024-10-15 DIAGNOSIS — G89.29 CHRONIC PAIN OF BOTH SHOULDERS: ICD-10-CM

## 2024-10-15 DIAGNOSIS — M25.561 CHRONIC PAIN OF RIGHT KNEE: Primary | ICD-10-CM

## 2024-10-15 DIAGNOSIS — M25.511 CHRONIC PAIN OF BOTH SHOULDERS: ICD-10-CM

## 2024-10-15 DIAGNOSIS — M25.512 CHRONIC PAIN OF BOTH SHOULDERS: ICD-10-CM

## 2024-10-15 DIAGNOSIS — G89.29 CHRONIC PAIN OF RIGHT KNEE: Primary | ICD-10-CM

## 2024-10-15 PROCEDURE — 97110 THERAPEUTIC EXERCISES: CPT | Mod: GP | Performed by: PHYSICAL THERAPIST

## 2024-10-22 ENCOUNTER — TRANSFERRED RECORDS (OUTPATIENT)
Dept: HEALTH INFORMATION MANAGEMENT | Facility: CLINIC | Age: 64
End: 2024-10-22
Payer: COMMERCIAL

## 2024-10-22 LAB — RETINOPATHY: NEGATIVE

## 2024-10-29 ENCOUNTER — THERAPY VISIT (OUTPATIENT)
Dept: PHYSICAL THERAPY | Facility: CLINIC | Age: 64
End: 2024-10-29
Attending: FAMILY MEDICINE
Payer: COMMERCIAL

## 2024-10-29 DIAGNOSIS — M25.512 CHRONIC PAIN OF BOTH SHOULDERS: ICD-10-CM

## 2024-10-29 DIAGNOSIS — G89.29 CHRONIC PAIN OF RIGHT KNEE: Primary | ICD-10-CM

## 2024-10-29 DIAGNOSIS — M25.511 CHRONIC PAIN OF BOTH SHOULDERS: ICD-10-CM

## 2024-10-29 DIAGNOSIS — M25.561 CHRONIC PAIN OF RIGHT KNEE: Primary | ICD-10-CM

## 2024-10-29 DIAGNOSIS — G89.29 CHRONIC PAIN OF BOTH SHOULDERS: ICD-10-CM

## 2024-10-29 PROCEDURE — 97110 THERAPEUTIC EXERCISES: CPT | Mod: GP | Performed by: PHYSICAL THERAPIST

## 2024-11-08 ENCOUNTER — THERAPY VISIT (OUTPATIENT)
Dept: PHYSICAL THERAPY | Facility: CLINIC | Age: 64
End: 2024-11-08
Attending: FAMILY MEDICINE
Payer: COMMERCIAL

## 2024-11-08 DIAGNOSIS — M25.511 CHRONIC PAIN OF BOTH SHOULDERS: ICD-10-CM

## 2024-11-08 DIAGNOSIS — M25.512 CHRONIC PAIN OF BOTH SHOULDERS: ICD-10-CM

## 2024-11-08 DIAGNOSIS — M25.561 CHRONIC PAIN OF RIGHT KNEE: Primary | ICD-10-CM

## 2024-11-08 DIAGNOSIS — G89.29 CHRONIC PAIN OF BOTH SHOULDERS: ICD-10-CM

## 2024-11-08 DIAGNOSIS — G89.29 CHRONIC PAIN OF RIGHT KNEE: Primary | ICD-10-CM

## 2024-11-08 PROCEDURE — 97110 THERAPEUTIC EXERCISES: CPT | Mod: GP | Performed by: PHYSICAL THERAPIST

## 2024-12-23 ENCOUNTER — TELEPHONE (OUTPATIENT)
Dept: FAMILY MEDICINE | Facility: CLINIC | Age: 64
End: 2024-12-23
Payer: COMMERCIAL

## 2024-12-23 DIAGNOSIS — N18.30 TYPE 2 DIABETES MELLITUS WITH STAGE 3 CHRONIC KIDNEY DISEASE, WITHOUT LONG-TERM CURRENT USE OF INSULIN, UNSPECIFIED WHETHER STAGE 3A OR 3B CKD (H): ICD-10-CM

## 2024-12-23 DIAGNOSIS — E11.22 TYPE 2 DIABETES MELLITUS WITH STAGE 3 CHRONIC KIDNEY DISEASE, WITHOUT LONG-TERM CURRENT USE OF INSULIN, UNSPECIFIED WHETHER STAGE 3A OR 3B CKD (H): ICD-10-CM

## 2024-12-23 DIAGNOSIS — N18.30 STAGE 3 CHRONIC KIDNEY DISEASE, UNSPECIFIED WHETHER STAGE 3A OR 3B CKD (H): ICD-10-CM

## 2024-12-23 RX ORDER — EMPAGLIFLOZIN 25 MG/1
25 TABLET, FILM COATED ORAL DAILY
Qty: 90 TABLET | Refills: 0 | Status: SHIPPED | OUTPATIENT
Start: 2024-12-23

## 2024-12-23 NOTE — TELEPHONE ENCOUNTER
GFR Estimate   Date Value Ref Range Status   09/17/2024 49 (L) >60 mL/min/1.73m2 Final     Comment:     eGFR calculated using 2021 CKD-EPI equation.   02/04/2020 82 >60 mL/min/[1.73_m2] Final     Comment:     Non  GFR Calc  Starting 12/18/2018, serum creatinine based estimated GFR (eGFR) will be   calculated using the Chronic Kidney Disease Epidemiology Collaboration   (CKD-EPI) equation.

## 2024-12-23 NOTE — TELEPHONE ENCOUNTER
Pt says he took one pill today and only has one for tomorrow so needs this filled soon.  He was last seen for diabetic check 7/1/24 and is not aware that he is due yet for this.  Please let him know if he needs apt but he will need this filled in the meantime.

## 2025-01-03 PROBLEM — M25.511 CHRONIC PAIN OF BOTH SHOULDERS: Status: RESOLVED | Noted: 2024-10-01 | Resolved: 2025-01-03

## 2025-01-03 PROBLEM — G89.29 CHRONIC PAIN OF BOTH SHOULDERS: Status: RESOLVED | Noted: 2024-10-01 | Resolved: 2025-01-03

## 2025-01-03 PROBLEM — G89.29 CHRONIC PAIN OF RIGHT KNEE: Status: RESOLVED | Noted: 2024-10-01 | Resolved: 2025-01-03

## 2025-01-03 PROBLEM — M25.512 CHRONIC PAIN OF BOTH SHOULDERS: Status: RESOLVED | Noted: 2024-10-01 | Resolved: 2025-01-03

## 2025-01-03 PROBLEM — M25.561 CHRONIC PAIN OF RIGHT KNEE: Status: RESOLVED | Noted: 2024-10-01 | Resolved: 2025-01-03

## 2025-01-06 DIAGNOSIS — E11.8 DIABETIC COMPLICATION (H): ICD-10-CM

## 2025-01-06 DIAGNOSIS — N18.31 STAGE 3A CHRONIC KIDNEY DISEASE (H): Primary | ICD-10-CM

## 2025-02-02 ENCOUNTER — HEALTH MAINTENANCE LETTER (OUTPATIENT)
Age: 65
End: 2025-02-02

## 2025-02-04 ENCOUNTER — LAB (OUTPATIENT)
Dept: LAB | Facility: CLINIC | Age: 65
End: 2025-02-04
Payer: COMMERCIAL

## 2025-02-04 ENCOUNTER — TELEPHONE (OUTPATIENT)
Dept: FAMILY MEDICINE | Facility: CLINIC | Age: 65
End: 2025-02-04

## 2025-02-04 DIAGNOSIS — N18.31 STAGE 3A CHRONIC KIDNEY DISEASE (H): ICD-10-CM

## 2025-02-04 DIAGNOSIS — E11.8 DIABETIC COMPLICATION (H): ICD-10-CM

## 2025-02-04 LAB
ALBUMIN MFR UR ELPH: 213.5 MG/DL
ALBUMIN SERPL BCG-MCNC: 4.3 G/DL (ref 3.5–5.2)
ALBUMIN UR-MCNC: >=300 MG/DL
ANION GAP SERPL CALCULATED.3IONS-SCNC: 12 MMOL/L (ref 7–15)
APPEARANCE UR: CLEAR
BACTERIA #/AREA URNS HPF: ABNORMAL /HPF
BILIRUB UR QL STRIP: NEGATIVE
BUN SERPL-MCNC: 54 MG/DL (ref 8–23)
CALCIUM SERPL-MCNC: 9.7 MG/DL (ref 8.8–10.4)
CHLORIDE SERPL-SCNC: 103 MMOL/L (ref 98–107)
COLOR UR AUTO: YELLOW
CREAT SERPL-MCNC: 1.88 MG/DL (ref 0.67–1.17)
CREAT UR-MCNC: 110.8 MG/DL
EGFRCR SERPLBLD CKD-EPI 2021: 40 ML/MIN/1.73M2
ERYTHROCYTE [DISTWIDTH] IN BLOOD BY AUTOMATED COUNT: 12.8 % (ref 10–15)
FERRITIN SERPL-MCNC: 1136 NG/ML (ref 31–409)
GLUCOSE SERPL-MCNC: 226 MG/DL (ref 70–99)
GLUCOSE UR STRIP-MCNC: >=1000 MG/DL
HCO3 SERPL-SCNC: 25 MMOL/L (ref 22–29)
HCT VFR BLD AUTO: 38.8 % (ref 40–53)
HGB BLD-MCNC: 13.2 G/DL (ref 13.3–17.7)
HGB UR QL STRIP: ABNORMAL
IRON BINDING CAPACITY (ROCHE): 294 UG/DL (ref 240–430)
IRON SATN MFR SERPL: 34 % (ref 15–46)
IRON SERPL-MCNC: 101 UG/DL (ref 61–157)
KETONES UR STRIP-MCNC: NEGATIVE MG/DL
LEUKOCYTE ESTERASE UR QL STRIP: NEGATIVE
MCH RBC QN AUTO: 30.3 PG (ref 26.5–33)
MCHC RBC AUTO-ENTMCNC: 34 G/DL (ref 31.5–36.5)
MCV RBC AUTO: 89 FL (ref 78–100)
NITRATE UR QL: NEGATIVE
PH UR STRIP: 5 [PH] (ref 5–7)
PHOSPHATE SERPL-MCNC: 4.1 MG/DL (ref 2.5–4.5)
PLATELET # BLD AUTO: 233 10E3/UL (ref 150–450)
POTASSIUM SERPL-SCNC: 4.3 MMOL/L (ref 3.4–5.3)
PROT/CREAT 24H UR: 1.93 MG/MG CR (ref 0–0.2)
RBC # BLD AUTO: 4.35 10E6/UL (ref 4.4–5.9)
RBC #/AREA URNS AUTO: ABNORMAL /HPF
SODIUM SERPL-SCNC: 140 MMOL/L (ref 135–145)
SP GR UR STRIP: 1.02 (ref 1–1.03)
SQUAMOUS #/AREA URNS AUTO: ABNORMAL /LPF
URATE SERPL-MCNC: 9.6 MG/DL (ref 3.4–7)
UROBILINOGEN UR STRIP-ACNC: 0.2 E.U./DL
WBC # BLD AUTO: 9.1 10E3/UL (ref 4–11)
WBC #/AREA URNS AUTO: ABNORMAL /HPF

## 2025-02-04 PROCEDURE — 36415 COLL VENOUS BLD VENIPUNCTURE: CPT

## 2025-02-04 PROCEDURE — 83550 IRON BINDING TEST: CPT

## 2025-02-04 PROCEDURE — 80069 RENAL FUNCTION PANEL: CPT

## 2025-02-04 PROCEDURE — 82728 ASSAY OF FERRITIN: CPT

## 2025-02-04 PROCEDURE — 84550 ASSAY OF BLOOD/URIC ACID: CPT

## 2025-02-04 PROCEDURE — 83540 ASSAY OF IRON: CPT

## 2025-02-04 PROCEDURE — 81001 URINALYSIS AUTO W/SCOPE: CPT

## 2025-02-04 PROCEDURE — 85027 COMPLETE CBC AUTOMATED: CPT

## 2025-02-04 PROCEDURE — 84156 ASSAY OF PROTEIN URINE: CPT

## 2025-02-04 NOTE — TELEPHONE ENCOUNTER
Patient is calling regarding labs. He just had labs drawn at the clinic per nephrologist Dr. Doss.  Patient reports that he received a message in [facility] indicating he was due for Hgb A1c check.     Last Hgb A1c was don 10/8/24 and recommended to be rechecked in 1 year as noted in Dr. Garza's result note:    HgbA1c is 5.9%, this is in the pre-diabetic range.     Recommend cutting down on the simple sweets and carbohydrates (bread/rice/pasta/potatoes).  Increase exercise, work on weight loss.     We'll continue to monitor this - recheck in 1 year.     Ara Garza M.D.  (I am covering for Dr. Colvin who is away from the office today.)      Patient reports that morning blood sugar checks have been 200-220. He told lab he needed level checked and they to have appropriate tube. Patient will need an order to add on labs for HgbA1c.    Will route to Dr. Garza as Vinicius is out of clinic today. Please advise if this needs to be addressed by PCP instead.    Thank you,  Shamika Aldridge RN

## 2025-02-26 ENCOUNTER — OFFICE VISIT (OUTPATIENT)
Dept: FAMILY MEDICINE | Facility: CLINIC | Age: 65
End: 2025-02-26
Payer: COMMERCIAL

## 2025-02-26 ENCOUNTER — LAB (OUTPATIENT)
Dept: LAB | Facility: CLINIC | Age: 65
End: 2025-02-26
Payer: COMMERCIAL

## 2025-02-26 VITALS
BODY MASS INDEX: 35.5 KG/M2 | RESPIRATION RATE: 16 BRPM | HEART RATE: 68 BPM | HEIGHT: 71 IN | DIASTOLIC BLOOD PRESSURE: 82 MMHG | TEMPERATURE: 98.2 F | SYSTOLIC BLOOD PRESSURE: 148 MMHG | OXYGEN SATURATION: 97 % | WEIGHT: 253.6 LBS

## 2025-02-26 DIAGNOSIS — N18.30 TYPE 2 DIABETES MELLITUS WITH STAGE 3 CHRONIC KIDNEY DISEASE, WITHOUT LONG-TERM CURRENT USE OF INSULIN, UNSPECIFIED WHETHER STAGE 3A OR 3B CKD (H): Primary | ICD-10-CM

## 2025-02-26 DIAGNOSIS — E11.22 TYPE 2 DIABETES MELLITUS WITH STAGE 3 CHRONIC KIDNEY DISEASE, WITHOUT LONG-TERM CURRENT USE OF INSULIN, UNSPECIFIED WHETHER STAGE 3A OR 3B CKD (H): Primary | ICD-10-CM

## 2025-02-26 DIAGNOSIS — E11.8 DIABETIC COMPLICATION (H): ICD-10-CM

## 2025-02-26 DIAGNOSIS — N18.30 CHRONIC KIDNEY DISEASE, STAGE III (MODERATE) (H): Primary | ICD-10-CM

## 2025-02-26 DIAGNOSIS — N18.30 STAGE 3 CHRONIC KIDNEY DISEASE, UNSPECIFIED WHETHER STAGE 3A OR 3B CKD (H): ICD-10-CM

## 2025-02-26 DIAGNOSIS — I10 BENIGN ESSENTIAL HYPERTENSION: ICD-10-CM

## 2025-02-26 DIAGNOSIS — N18.30 CHRONIC KIDNEY DISEASE, STAGE III (MODERATE) (H): ICD-10-CM

## 2025-02-26 DIAGNOSIS — E79.0 ELEVATED URIC ACID IN BLOOD: ICD-10-CM

## 2025-02-26 LAB
ANION GAP SERPL CALCULATED.3IONS-SCNC: 11 MMOL/L (ref 7–15)
BUN SERPL-MCNC: 44.5 MG/DL (ref 8–23)
CALCIUM SERPL-MCNC: 9.8 MG/DL (ref 8.8–10.4)
CHLORIDE SERPL-SCNC: 106 MMOL/L (ref 98–107)
CREAT SERPL-MCNC: 1.67 MG/DL (ref 0.67–1.17)
EGFRCR SERPLBLD CKD-EPI 2021: 46 ML/MIN/1.73M2
EST. AVERAGE GLUCOSE BLD GHB EST-MCNC: 189 MG/DL
GLUCOSE SERPL-MCNC: 251 MG/DL (ref 70–99)
HBA1C MFR BLD: 8.2 % (ref 0–5.6)
HCO3 SERPL-SCNC: 23 MMOL/L (ref 22–29)
HOLD SPECIMEN: NORMAL
POTASSIUM SERPL-SCNC: 4.2 MMOL/L (ref 3.4–5.3)
SODIUM SERPL-SCNC: 140 MMOL/L (ref 135–145)

## 2025-02-26 PROCEDURE — 80048 BASIC METABOLIC PNL TOTAL CA: CPT

## 2025-02-26 PROCEDURE — 36415 COLL VENOUS BLD VENIPUNCTURE: CPT

## 2025-02-26 PROCEDURE — 99214 OFFICE O/P EST MOD 30 MIN: CPT | Performed by: FAMILY MEDICINE

## 2025-02-26 PROCEDURE — 83036 HEMOGLOBIN GLYCOSYLATED A1C: CPT

## 2025-02-26 PROCEDURE — G2211 COMPLEX E/M VISIT ADD ON: HCPCS | Performed by: FAMILY MEDICINE

## 2025-02-26 ASSESSMENT — PAIN SCALES - GENERAL: PAINLEVEL_OUTOF10: NO PAIN (0)

## 2025-02-26 NOTE — PATIENT INSTRUCTIONS
Monitor BP    Consider Continuous glucose monitor     Starter dose of mounjaro for 1 month than increase dose-see me in 3 months    If nutrition isn't covered let me know and I will put referral for [facility]    Please let us know how your BP and glucose numbers are

## 2025-02-26 NOTE — PROGRESS NOTES
Assessment & Plan     Type 2 diabetes mellitus with stage 3 chronic kidney disease, without long-term current use of insulin, unspecified whether stage 3a or 3b CKD (H)  Unfortunately A1c has increased substantially, per patient his blood sugars have been running over 200s.  Will plan to add on Mounjaro in addition to his Jardiance to help with both weight loss and blood sugar control.  He is planning to see dietitian through [facility] however if not covered he will let me know.  Discussed diabetic diet.  Close follow-up in 3 months  - HEMOGLOBIN A1C  - Tirzepatide 2.5 MG/0.5ML SOAJ  Dispense: 2 mL; Refill: 0  - Tirzepatide 5 MG/0.5ML SOAJ  Dispense: 6 mL; Refill: 0  - empagliflozin (JARDIANCE) 25 MG TABS tablet  Dispense: 90 tablet; Refill: 1    Stage 3 chronic kidney disease, unspecified whether stage 3a or 3b CKD (H)  Continue, working with Dr. Doss his nephrologist.  - empagliflozin (JARDIANCE) 25 MG TABS tablet  Dispense: 90 tablet; Refill: 1    Benign essential hypertension  Nephrology increased his losartan to 75 mg from 50 mg.  Blood pressure still mildly high in the office today however with recent change would like him to continue to monitor this.  If not well-controlled will increase to 100    Elevated uric acid in blood  Increased in his allopurinol from .  Elevated at his nephrologist office to 9.6        The longitudinal plan of care for the diagnosis(es)/condition(s) as documented were addressed during this visit. Due to the added complexity in care, I will continue to support Frank in the subsequent management and with ongoing continuity of care.    Mathew Marie is a 64 year old, presenting for the following health issues:  Diabetes and Hypertension        2/26/2025     9:02 AM   Additional Questions   Roomed by Kecia SOLIS MA   Accompanied by Self     History of Present Illness       CKD: He uses over the counter pain medication, including tylenol, a few times a month.    Diabetes:   He  "presents for follow up of diabetes.  He is checking home blood glucose a few times a month.   He checks blood glucose before meals and at bedtime.  Blood glucose is sometimes over 200 and never under 70.  When his blood glucose is low, the patient is asymptomatic for confusion, blurred vision, lethargy and reports not feeling dizzy, shaky, or weak.  He is concerned about blood sugar frequently over 200.   He is having numbness in feet, burning in feet and weight gain.            Hypertension: He presents for follow up of hypertension.  He does check blood pressure  regularly outside of the clinic. Outside blood pressures have been over 140/90. He follows a low salt diet.     He eats 2-3 servings of fruits and vegetables daily.He consumes 0 sweetened beverage(s) daily.He exercises with enough effort to increase his heart rate 9 or less minutes per day.  He exercises with enough effort to increase his heart rate 3 or less days per week.   He is taking medications regularly.     Discuss starting GLP1.      Allopurinol was changed to 100 mg    Losartan increased to 75 mg.    Cough- Dry cough, only with walking and moving around.    Pulse a few weeks ago was skipping beats.  Not currently doing it.  States has noticed in the past      Review of Systems  Constitutional, HEENT, cardiovascular, pulmonary, gi and gu systems are negative, except as otherwise noted.      Objective    BP (!) 148/82   Pulse 68   Temp 98.2  F (36.8  C)   Resp 16   Ht 1.803 m (5' 11\")   Wt 115 kg (253 lb 9.6 oz)   SpO2 97%   BMI 35.37 kg/m    Body mass index is 35.37 kg/m .  Physical Exam  Constitutional:       Appearance: Normal appearance.   HENT:      Head: Normocephalic.      Right Ear: Tympanic membrane normal.      Left Ear: Tympanic membrane normal.   Eyes:      Conjunctiva/sclera: Conjunctivae normal.   Cardiovascular:      Rate and Rhythm: Normal rate and regular rhythm.      Pulses: Normal pulses.   Pulmonary:      Effort: " Pulmonary effort is normal.      Breath sounds: Normal breath sounds.   Abdominal:      General: Bowel sounds are normal.   Skin:     General: Skin is warm and dry.   Neurological:      General: No focal deficit present.      Mental Status: He is alert.   Psychiatric:         Thought Content: Thought content normal.         Judgment: Judgment normal.              Signed Electronically by: JUNE ALDRICH DO

## 2025-03-11 ENCOUNTER — HOSPITAL ENCOUNTER (EMERGENCY)
Facility: CLINIC | Age: 65
Discharge: HOME | End: 2025-03-11
Payer: COMMERCIAL

## 2025-03-11 VITALS
RESPIRATION RATE: 18 BRPM | TEMPERATURE: 98.7 F | OXYGEN SATURATION: 98 % | SYSTOLIC BLOOD PRESSURE: 167 MMHG | DIASTOLIC BLOOD PRESSURE: 72 MMHG | HEART RATE: 69 BPM

## 2025-03-11 DIAGNOSIS — M25.531 RIGHT WRIST PAIN: ICD-10-CM

## 2025-03-11 PROCEDURE — 99213 OFFICE O/P EST LOW 20 MIN: CPT

## 2025-03-11 PROCEDURE — G0463 HOSPITAL OUTPT CLINIC VISIT: HCPCS

## 2025-03-11 RX ORDER — PREDNISONE 20 MG/1
TABLET ORAL
Qty: 15 TABLET | Refills: 0 | Status: SHIPPED | OUTPATIENT
Start: 2025-03-11 | End: 2025-03-21

## 2025-03-11 ASSESSMENT — COLUMBIA-SUICIDE SEVERITY RATING SCALE - C-SSRS
1. IN THE PAST MONTH, HAVE YOU WISHED YOU WERE DEAD OR WISHED YOU COULD GO TO SLEEP AND NOT WAKE UP?: NO
6. HAVE YOU EVER DONE ANYTHING, STARTED TO DO ANYTHING, OR PREPARED TO DO ANYTHING TO END YOUR LIFE?: NO
2. HAVE YOU ACTUALLY HAD ANY THOUGHTS OF KILLING YOURSELF IN THE PAST MONTH?: NO

## 2025-03-11 ASSESSMENT — ACTIVITIES OF DAILY LIVING (ADL): ADLS_ACUITY_SCORE: 41

## 2025-03-11 NOTE — ED PROVIDER NOTES
History     Chief Complaint   Patient presents with    Wrist Pain     RT     HPI  Frakn Mace is a 64 year old male who presents urgent care with chief complaint of right wrist pain.  Patient denies injury to the wrist.    Allergies:  Allergies   Allergen Reactions    Codeine Nausea     And dizzy    Iodinated Contrast Media Other (See Comments)     Avoids for kidney disease    Lactose     Peanuts [Nuts] Other (See Comments)     Burps for days       Problem List:    Patient Active Problem List    Diagnosis Date Noted    Arthrosis 04/29/2013     Priority: Medium        Past Medical History:    History reviewed. No pertinent past medical history.    Past Surgical History:    History reviewed. No pertinent surgical history.    Family History:    No family history on file.    Social History:  Marital Status:   [2]  Social History     Tobacco Use    Smoking status: Never     Passive exposure: Never    Smokeless tobacco: Never   Vaping Use    Vaping status: Never Used   Substance Use Topics    Alcohol use: Yes    Drug use: No        Medications:    allopurinol (ZYLOPRIM) 100 MG tablet  amLODIPine (NORVASC) 5 MG tablet  amoxicillin (AMOXIL) 500 MG tablet  atorvastatin (LIPITOR) 40 MG tablet  empagliflozin (JARDIANCE) 25 MG TABS tablet  Ferrous Fumarate (IRON) 18 MG TBCR  Ferrous Sulfate (IRON) 28 MG TABS  hydrochlorothiazide (HYDRODIURIL) 25 MG tablet  losartan (COZAAR) 25 MG tablet  metoprolol succinate ER (TOPROL XL) 50 MG 24 hr tablet  Multiple Vitamins-Minerals (MULTIVITAL PO)  Tirzepatide 2.5 MG/0.5ML SOAJ  [START ON 3/26/2025] Tirzepatide 5 MG/0.5ML SOAJ  Vitamin D3 (CHOLECALCIFEROL) 25 mcg (1000 units) tablet          Review of Systems   All other systems reviewed and are negative.      Physical Exam   BP: (!) 167/72  Pulse: 69  Temp: 98.7  F (37.1  C)  Resp: 18  SpO2: 98 %      Physical Exam  Vitals and nursing note reviewed.   Constitutional:       General: He is not in acute distress.     Appearance:  Normal appearance.   HENT:      Head: Normocephalic.   Cardiovascular:      Rate and Rhythm: Normal rate.      Pulses: Normal pulses.   Pulmonary:      Effort: Pulmonary effort is normal.   Neurological:      Mental Status: He is alert.         ED Course        Procedures      No results found for this or any previous visit (from the past 24 hours).    Medications - No data to display    Assessments & Plan (with Medical Decision Making)     I have reviewed the nursing notes.    I have reviewed the findings, diagnosis, plan and need for follow up with the patient.    Medical Decision Making                    Prior to making a final disposition on this patient the results of patient's tests and other diagnostic studies were discussed with the patient. All questions were answered. Patient expressed understanding of the plan and was amenable to it.     Disclaimer: This note consists of symbols derived from keyboarding, dictation and/or voice recognition software. As a result, there may be errors in the script that have gone undetected. Please consider this when interpreting information found in this chart.      New Prescriptions    No medications on file       Final diagnoses:   None       3/11/2025   [facility] EMERGENCY DEPT     disposition on this patient the results of patient's tests and other diagnostic studies were discussed with the patient. All questions were answered. Patient expressed understanding of the plan and was amenable to it.     Disclaimer: This note consists of symbols derived from keyboarding, dictation and/or voice recognition software. As a result, there may be errors in the script that have gone undetected. Please consider this when interpreting information found in this chart.      Discharge Medication List as of 3/11/2025  7:17 PM        START taking these medications    Details   predniSONE (DELTASONE) 20 MG tablet 2 tabs for 5 days, then 1 tab days 6-10, Disp-15 tablet, R-0, E-Prescribe             Final diagnoses:   Right wrist pain       3/11/2025   [facility] EMERGENCY DEPT       Sarah Castro PA-C  03/15/25 [id number]

## 2025-04-11 ENCOUNTER — ANCILLARY PROCEDURE (OUTPATIENT)
Dept: GENERAL RADIOLOGY | Facility: CLINIC | Age: 65
End: 2025-04-11
Attending: FAMILY MEDICINE
Payer: COMMERCIAL

## 2025-04-11 ENCOUNTER — OFFICE VISIT (OUTPATIENT)
Dept: FAMILY MEDICINE | Facility: CLINIC | Age: 65
End: 2025-04-11
Payer: COMMERCIAL

## 2025-04-11 VITALS
WEIGHT: 237.6 LBS | RESPIRATION RATE: 16 BRPM | HEIGHT: 71 IN | TEMPERATURE: 97.8 F | DIASTOLIC BLOOD PRESSURE: 82 MMHG | SYSTOLIC BLOOD PRESSURE: 134 MMHG | HEART RATE: 66 BPM | OXYGEN SATURATION: 98 % | BODY MASS INDEX: 33.26 KG/M2

## 2025-04-11 DIAGNOSIS — N18.30 STAGE 3 CHRONIC KIDNEY DISEASE, UNSPECIFIED WHETHER STAGE 3A OR 3B CKD (H): ICD-10-CM

## 2025-04-11 DIAGNOSIS — E11.22 TYPE 2 DIABETES MELLITUS WITH STAGE 3 CHRONIC KIDNEY DISEASE, WITHOUT LONG-TERM CURRENT USE OF INSULIN, UNSPECIFIED WHETHER STAGE 3A OR 3B CKD (H): ICD-10-CM

## 2025-04-11 DIAGNOSIS — M10.9 ACUTE GOUT OF MULTIPLE SITES, UNSPECIFIED CAUSE: ICD-10-CM

## 2025-04-11 DIAGNOSIS — M25.531 RIGHT WRIST PAIN: Primary | ICD-10-CM

## 2025-04-11 DIAGNOSIS — M1A.9XX0 CHRONIC GOUT WITHOUT TOPHUS, UNSPECIFIED CAUSE, UNSPECIFIED SITE: ICD-10-CM

## 2025-04-11 DIAGNOSIS — M25.531 RIGHT WRIST PAIN: ICD-10-CM

## 2025-04-11 DIAGNOSIS — N18.30 TYPE 2 DIABETES MELLITUS WITH STAGE 3 CHRONIC KIDNEY DISEASE, WITHOUT LONG-TERM CURRENT USE OF INSULIN, UNSPECIFIED WHETHER STAGE 3A OR 3B CKD (H): ICD-10-CM

## 2025-04-11 LAB
ALBUMIN SERPL BCG-MCNC: 4.2 G/DL (ref 3.5–5.2)
ALP SERPL-CCNC: 96 U/L (ref 40–150)
ALT SERPL W P-5'-P-CCNC: 19 U/L (ref 0–70)
ANION GAP SERPL CALCULATED.3IONS-SCNC: 16 MMOL/L (ref 7–15)
AST SERPL W P-5'-P-CCNC: 19 U/L (ref 0–45)
BILIRUB SERPL-MCNC: 0.7 MG/DL
BUN SERPL-MCNC: 66 MG/DL (ref 8–23)
CALCIUM SERPL-MCNC: 9.8 MG/DL (ref 8.8–10.4)
CHLORIDE SERPL-SCNC: 103 MMOL/L (ref 98–107)
CREAT SERPL-MCNC: 1.64 MG/DL (ref 0.67–1.17)
EGFRCR SERPLBLD CKD-EPI 2021: 47 ML/MIN/1.73M2
GLUCOSE SERPL-MCNC: 190 MG/DL (ref 70–99)
HCO3 SERPL-SCNC: 21 MMOL/L (ref 22–29)
POTASSIUM SERPL-SCNC: 4.5 MMOL/L (ref 3.4–5.3)
PROT SERPL-MCNC: 6.9 G/DL (ref 6.4–8.3)
SODIUM SERPL-SCNC: 140 MMOL/L (ref 135–145)
URATE SERPL-MCNC: 8.4 MG/DL (ref 3.4–7)

## 2025-04-11 PROCEDURE — 99214 OFFICE O/P EST MOD 30 MIN: CPT | Performed by: FAMILY MEDICINE

## 2025-04-11 PROCEDURE — 80053 COMPREHEN METABOLIC PANEL: CPT | Performed by: FAMILY MEDICINE

## 2025-04-11 PROCEDURE — 36415 COLL VENOUS BLD VENIPUNCTURE: CPT | Performed by: FAMILY MEDICINE

## 2025-04-11 PROCEDURE — 73110 X-RAY EXAM OF WRIST: CPT | Mod: TC | Performed by: RADIOLOGY

## 2025-04-11 PROCEDURE — G2211 COMPLEX E/M VISIT ADD ON: HCPCS | Performed by: FAMILY MEDICINE

## 2025-04-11 PROCEDURE — 84550 ASSAY OF BLOOD/URIC ACID: CPT | Performed by: FAMILY MEDICINE

## 2025-04-11 RX ORDER — PREDNISONE 20 MG/1
TABLET ORAL
Qty: 20 TABLET | Refills: 0 | Status: SHIPPED | OUTPATIENT
Start: 2025-04-11 | End: 2025-04-18

## 2025-04-11 ASSESSMENT — PAIN SCALES - GENERAL: PAINLEVEL_OUTOF10: MILD PAIN (2)

## 2025-04-11 NOTE — PROGRESS NOTES
"  Assessment & Plan     There are no diagnoses linked to this encounter.          BMI  Estimated body mass index is 33.14 kg/m  as calculated from the following:    Height as of this encounter: 1.803 m (5' 11\").    Weight as of this encounter: 107.8 kg (237 lb 9.6 oz).   {Weight Management Plan needed for ACO:925418}      {FOLLOW UP PLANS (Optional) Includes COVID19 Treatment Plan:285657}    Mathew Marie is a 64 year old, presenting for the following health issues:  Arthritis        4/11/2025     7:08 AM   Additional Questions   Roomed by Kecia SOLIS MA   Accompanied by Self     History of Present Illness       Reason for visit:  Wrist pain knee ankle foot day seven prednisone   He is taking medications regularly.      Went to urgent care on 3/11/25 for gout.  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  Called in again on 4/4/25 and was prescribed another course of prednisone.      When he was seen, bottom of feet, left ankle, right wrist.      Gout/ Single Inflamed Joint  Onset/Duration: Since beginning of March  Description:   Location: foot, knee, and wrist - left and right  Joint Swelling: YES-little bit on foot, lump on left knee. Right wrist  Redness: YES- just a little on the wrist  Pain: YES  Intensity: mild  Progression of Symptoms: same  Accompanying Signs & Symptoms:  Fevers: No  History:   Trauma to the area: No  Previous history of gout: YES  Recent illness: No  Alcohol use: No  Diuretic use: YES  Precipitating or alleviating factors: None  Therapies tried and outcome: none, corticosteroids, and allopurinol  {additonal problems for provider to add (Optional):469225}    {ROS Picklists (Optional):233856}      Objective    /82 (BP Location: Right arm, Patient Position: Chair, Cuff Size: Adult Large)   Pulse 66   Temp 97.8  F (36.6  C)   Resp 16   Ht 1.803 m (5' 11\")   Wt 107.8 kg (237 lb 9.6 oz)   SpO2 98%   BMI 33.14 kg/m    Body mass " index is 33.14 kg/m .  Physical Exam   {Exam List (Optional):173612}    {Diagnostic Test Results (Optional):626382}        Signed Electronically by: Sofia Colvin DO  {Email feedback regarding this note to [email or web address]   :[id number]}

## 2025-04-14 ENCOUNTER — PATIENT OUTREACH (OUTPATIENT)
Dept: CARE COORDINATION | Facility: CLINIC | Age: 65
End: 2025-04-14
Payer: COMMERCIAL

## 2025-04-16 ENCOUNTER — PATIENT OUTREACH (OUTPATIENT)
Dept: CARE COORDINATION | Facility: CLINIC | Age: 65
End: 2025-04-16
Payer: COMMERCIAL

## 2025-04-18 ENCOUNTER — TELEPHONE (OUTPATIENT)
Dept: FAMILY MEDICINE | Facility: CLINIC | Age: 65
End: 2025-04-18
Payer: COMMERCIAL

## 2025-04-18 DIAGNOSIS — M1A.9XX0 CHRONIC GOUT WITHOUT TOPHUS, UNSPECIFIED CAUSE, UNSPECIFIED SITE: ICD-10-CM

## 2025-04-18 DIAGNOSIS — M25.531 RIGHT WRIST PAIN: ICD-10-CM

## 2025-04-18 RX ORDER — PREDNISONE 20 MG/1
TABLET ORAL
Qty: 20 TABLET | Refills: 0 | Status: SHIPPED | OUTPATIENT
Start: 2025-04-18

## 2025-04-18 RX ORDER — ALLOPURINOL 100 MG/1
200 TABLET ORAL DAILY
Qty: 60 TABLET | Refills: 1 | Status: SHIPPED | OUTPATIENT
Start: 2025-04-18 | End: 2025-05-18

## 2025-04-18 NOTE — TELEPHONE ENCOUNTER
The patient states that the pain is coming back. The patient states it is tolerable but if it does worsen he will end up needing to take prednisone regarding his joints.  The patient takes about 2.5 L daily regular.    Thank you    Zofia SOUZA RN

## 2025-04-23 ENCOUNTER — OFFICE VISIT (OUTPATIENT)
Dept: EDUCATION SERVICES | Facility: CLINIC | Age: 65
End: 2025-04-23
Payer: COMMERCIAL

## 2025-04-23 DIAGNOSIS — E11.9 DIABETES MELLITUS (H): Primary | ICD-10-CM

## 2025-04-23 PROCEDURE — 95250 CONT GLUC MNTR PHYS/QHP EQP: CPT | Mod: AE | Performed by: DIETITIAN, REGISTERED

## 2025-04-23 PROCEDURE — G0108 DIAB MANAGE TRN  PER INDIV: HCPCS | Mod: AE | Performed by: DIETITIAN, REGISTERED

## 2025-04-23 NOTE — PATIENT INSTRUCTIONS
Plan     Aim to eat 3 meals/day, no longer than 4-5 hours between meals (except for the 12 hour fast overnight).  Aim to be as active as possible.  Ideally 20-30 minutes 5 days/week.  Take Jardiance and Mounjaro as prescribed.  Test your BG 1 time/day alternating testing times: fasting or before or 2 hrs after the start of the meal.  Record.  Wear the professional Dexcom cgms until 5/3/25.    6.  Follow up with Sofia Colvin as directed.  7.  Follow up with Diabetes Education 5/7/25 at 9am.  If you have questions you can send them through [facility] or call Diabetes Education Triage [phone].  For Scheduling call [phone].    Kira Sotomayor RD, [facility], 8:48 AM, 4/23/2025    Dexcom G6 Pro Patient Instructions:    1. Plan to wear the Dexcom G6 Pro sensor for 10 days.  It is okay to shower, bathe, and swim.    2. Continue with your usual diabetes care plan - check blood sugars and take medicines, as prescribed.    3. Keep a log of the following things while wearing the sensor:  A. Food/drink: write down what you eat and drink while wearing the sensor, including the amounts  B. Medications: write down the times you take your medications and the dose or units you took  C. Exercise: write down any exercise you do while wearing the sensor.    4. Use a little extra care, especially when getting dressed or exercising, to avoid accidentally loosening or removing the sensor.  And try to avoid laying on the sensor at night.    5. Remove the sensor if you need to have an MRI or full body scans.   Do not throw the sensor away after you remove it.  We can still get the data from the time the sensor was worn.    6. Do not put any sunscreens or lotions on the sensor/transmitter.  Never disconnect the sensor from the transmitter- it will stop the study.    7. If the Dexcom 6 Pro sensor comes off early, place it in a plastic bag or envelope and bring it with you to your follow-up visit.     8. You may remove your sensor on  5/3/25 as it will no longer be recording information after this time.  Just peel it off like a band-aid.  You can use adhesive remover to assist with removal as needed.  Make sure you put the sensor and transmitter in an envelope or bag and bring it with you to the follow-up visit to be downloaded.  We cannot review your information without it.     YOU MUST RETURN THE SENSOR TO BE DOWNLOADED WITHIN 30 DAYS (FROM THE DATE THE SENSOR WAS PLACED). DATA CANNOT BE OBTAINED FROM THE SYSTEM AFTER THIS TIME.      Follow-up appointment: 5/7/25.      [place] [facility] for the [facility]:  For Your Diabetes Education and Nutrition Appointments Call:  [phone]   For Diabetes Education or Nutrition Related Questions:   Phone: [phone]  Send [place] Message   If you need a medication refill please contact your pharmacy. Please allow 3 business days for your refills to be completed.

## 2025-04-23 NOTE — PROGRESS NOTES
Diabetes Self-Management Education & Support    Presents for:      Type of Service: In Person Visit      Assessment  -type 2 diabetes for ~13-14 years  -recent a1c of 8.2 (previous 5.9, in 2016 11.0)  -currently being managed with Jardiance, Mounjaro (Metformin caused upset stomach)  -on statin therapy  -diabetes complications:  CKD  -last seen by diabetes education 8/10/2016  -home BG monitoring reveals:  57% in target  (see readings below)  -on oral prednisone a couple times since early March due to gout    Primary concern: wants cgms. Dr. Colvin considering increasing Mounjaro to 7.5 mg.   Wants improved glycemic control due to CKD.      Discussed: option of personal and professional cgms (wants to pursue professional cgms),  basic physiology, natural progression of type 2, mechanism of action of medication (Mounjaro), managing side effects of glp-1/gip, potential complications of uncontrolled diabetes, care team.  Patient expressed understanding.      Patient's most recent   Lab Results   Component Value Date    A1C 8.2 02/26/2025     is not meeting goal of <7.0    Diabetes knowledge and skills assessment:   Patient is knowledgeable in diabetes management concepts related to: Taking Medication    Based on learning assessment above, most appropriate setting for further diabetes education would be: Individual setting.    Care Plan and Education Provided:  Healthy Eating: Portion control  Being Active: Finding a physical activity routine that works for you  Monitoring: Blood glucose versus Continuous Glucose Monitoring and Log and interpret results  Taking Medication: Action of prescribed medication(s) and Side effects of prescribed medication(s)  Problem Solving: High glucose - causes, signs/symptoms, treatment and prevention    Patient verbalized understanding of diabetes self-management education concepts discussed, opportunities for ongoing education and support, and recommendations provided today.    Plan      "Aim to eat 3 meals/day, no longer than 4-5 hours between meals (except for the 12 hour fast overnight).  Aim to be as active as possible.  Ideally 20-30 minutes 5 days/week.  Take Jardiance and Mounjaro as prescribed.  Test your BG 1 time/day alternating testing times: fasting or before or 2 hrs after the start of the meal.  Record.  Wear the professional Dexcom cgms until 5/3/25.    6.  Follow up with Sofia Colvin as directed.  7.  Follow up with Diabetes Education 5/7/25 at 9am.  If you have questions you can send them through [employer] or call Diabetes Education Triage [phone].  For Scheduling call [phone].     Topics to cover at upcoming visits: Healthy Eating, Being Active, Monitoring, Taking Medication, Problem Solving, Reducing Risks, and Healthy Coping    Follow-up:  Upcoming Diabetes Ed Appointments     Visit Type Date Time Department    PROFESSIONAL CGM 4/23/2025  8:00 AM [facility] DIABETES ED    PROFESSIONAL CGM 5/7/2025  9:00 AM [facility] DIABETES ED        See Care Plan for co-developed, patient-state behavior change goals.    Education Materials Provided:  Managing Side effects of Mounjaro    Subjective/Objective  Frank is an 64 year old, presenting for the following diabetes education related to:    Cultural Influences/Ethnic Background:  Not  or       Diabetes Symptoms & Complications:  Diabetes Related Symptoms: (Patient-Rptd) Fatigue, Neuropathy, Polyuria (increased urination)  Weight trend: (Patient-Rptd) Decreasing       Patient Problem List and Family Medical History reviewed for relevant medical history, current medical status, and diabetes risk factors.    Vitals:  There were no vitals taken for this visit.  Estimated body mass index is 33.14 kg/m  as calculated from the following:    Height as of 4/11/25: 1.803 m (5' 11\").    Weight as of 4/11/25: 107.8 kg (237 lb 9.6 oz).   Last 3 BP:   BP Readings from Last 3 Encounters:   04/11/25 134/82   03/11/25 (!) 167/72   02/26/25 (!) " 148/82       History   Smoking Status    Never   Smokeless Tobacco    Never       Labs:  Lab Results   Component Value Date    A1C 8.2 02/26/2025     Lab Results   Component Value Date     04/11/2025     02/04/2020     Lab Results   Component Value Date    LDL 93 06/20/2024     Direct Measure HDL   Date Value Ref Range Status   06/20/2024 37 (L) >=40 mg/dL Final   ]  GFR Estimate   Date Value Ref Range Status   04/11/2025 47 (L) >60 mL/min/1.73m2 Final     Comment:     eGFR calculated using 2021 CKD-EPI equation.   02/04/2020 82 >60 mL/min/[1.73_m2] Final     Comment:     Non  GFR Calc  Starting 12/18/2018, serum creatinine based estimated GFR (eGFR) will be   calculated using the Chronic Kidney Disease Epidemiology Collaboration   (CKD-EPI) equation.       GFR Estimate If Black   Date Value Ref Range Status   02/04/2020 >90 >60 mL/min/[1.73_m2] Final     Comment:      GFR Calc  Starting 12/18/2018, serum creatinine based estimated GFR (eGFR) will be   calculated using the Chronic Kidney Disease Epidemiology Collaboration   (CKD-EPI) equation.       Lab Results   Component Value Date    CR 1.64 04/11/2025    CR 1.00 02/04/2020     Lab Results   Component Value Date    MICROL 849.4 10/08/2024    UMALCR 1,187.97 (H) 10/08/2024    UCRR 110.8 02/04/2025 4/23/2025   Healthy Eating   Healthy Eating Assessed Today Yes   Cultural/Latter-day diet restrictions? No   Meal planning/habits Low salt;Low carb   Who cooks/prepares meals for you? Self;Spouse   Who purchases food in  your home? Self;Spouse   How many times a week on average do you eat food made away from home (restaurant/take-out)? 1   Meals include Breakfast;Lunch;Dinner;Afternoon Snack   Breakfast 1 egg omlette w/ swiss cheese, greek yogurt w/ blueberries, water, coffee- black   Lunch popcorn chicken, fruit, coffee w/ 1/2 n 1/2   Dinner salt free: whole tomatoes, venison brats, mushrooms, onions   Snacks PM:  fruit w/ cool whip; HS: generally not in the past 2 months (since staring Mounjaro)   Beverages Water;Diet soda;Coffee drinks;Coffee         4/23/2025   Being Active   Being Active Assessed Today Yes   Exercise: Currently not exercising   Barrier to exercise Other         4/23/2025   Monitoring   Monitoring Assessed Today Yes   Did patient bring glucose meter to appointment?  Yes   Blood Glucose Meter Reli-On   Times checking blood sugar at home (number) 2   Times checking blood sugar at home (per) Week   Blood glucose trend --   No recent data  Date Breakfast  Lunch  Dinner  Bedtime    Before After Before After Before After    ?     258          194      123                128          120          130        192         Diabetes Medication(s)       Sodium-Glucose Co-Transporter 2 (SGLT2) Inhibitors       empagliflozin (JARDIANCE) 25 MG TABS tablet Take 1 tablet (25 mg) by mouth daily.       Incretin Mimetic Agents       tirzepatide (MOUNJARO) 5 MG/0.5ML SOAJ auto-injector pen Inject 0.5 mLs (5 mg) subcutaneously every 7 days.              4/23/2025   Taking Medications   Taking Medication Assessed Today Yes   Current Treatments Non-insulin Injectables;Oral Medication (taken by mouth)   Problems taking diabetes medications regularly? No   Diabetes medication side effects? No          No data to display                  Encounter Type: Individual    Any diabetes medication dose changes were [facility] under the patient's referring provider.    Diabetes Self-Management Education & Support    Assessment    Patient comments/concerns: Patient does not have any questions or concerns    Professional CGM study indicated for:       Intervention  Sensor started today.               Diabetes management related comments/concerns: interested in professional cgms  Sensor was inserted with no resistance or bleeding at insertion site.      Pt will plan to wear the sensor through 5/3/25    Dexcom  Pro-specific education provided on: Sensor insertion, intention of monitoring for 10 days. Keep records of BG, food intake, exercise, and medication dosing during weather.   This is a blinded study.    Patient verbalized understanding of diabetes self-management education concepts discussed, opportunities for ongoing education and support, and recommendations provided today.  Patient signed cgms agreement.    Educational and other materials:  Food/exercise/medication log sheets    Plan  Patient was given instructions for tracking blood glucose medications, food intake and activity.  Patient to return all items associated with the professional Continuous Glucose Monitor System.  See Patient Instructions.    Follow-up:  Upcoming Diabetes Ed Appointments     Visit Type Date Time Department    PROFESSIONAL CGM 4/23/2025  8:00 AM CL DIABETES ED    PROFESSIONAL CGM 5/7/2025  9:00 AM CL DIABETES ED        Subjective/Objective  Frank is an 64 year old, presenting for the following diabetes education related to: Individual review    Lab Results:  Lab Results   Component Value Date    A1C 8.2 02/26/2025       Medication:  Diabetes Medication(s)       Sodium-Glucose Co-Transporter 2 (SGLT2) Inhibitors       empagliflozin (JARDIANCE) 25 MG TABS tablet Take 1 tablet (25 mg) by mouth daily.       Incretin Mimetic Agents       tirzepatide (MOUNJARO) 5 MG/0.5ML SOAJ auto-injector pen Inject 0.5 mLs (5 mg) subcutaneously every 7 days.              YULISSA Sotomayor RD, [facility], 9:06 AM, 4/23/2025    Time spent in DSMT: 30 minutes   Time spent in CGM insertion: 20 minutes, in addition to time spent in DSMT  Encounter Type: Individual

## 2025-04-23 NOTE — LETTER
4/23/2025         RE: Frank Mace  [address] [place] [address]        Dear Colleague,    Thank you for referring your patient, Frank Mace, to the [facility]. Please see a copy of my visit note below.    Diabetes Self-Management Education & Support    Presents for:      Type of Service: In Person Visit      Assessment  -type 2 diabetes for ~13-14 years  -recent a1c of 8.2 (previous 5.9, in 2016 11.0)  -currently being managed with Jardiance, Mounjaro (Metformin caused upset stomach)  -on statin therapy  -diabetes complications:  CKD  -last seen by diabetes education 8/10/2016  -home BG monitoring reveals:  57% in target  (see readings below)  -on oral prednisone a couple times since early March due to gout    Primary concern: wants cgms. Dr. Colvin considering increasing Mounjaro to 7.5 mg.   Wants improved glycemic control due to CKD.      Discussed: option of personal and professional cgms (wants to pursue professional cgms),  basic physiology, natural progression of type 2, mechanism of action of medication (Mounjaro), managing side effects of glp-1/gip, potential complications of uncontrolled diabetes, care team.  Patient expressed understanding.      Patient's most recent   Lab Results   Component Value Date    A1C 8.2 02/26/2025     is not meeting goal of <7.0    Diabetes knowledge and skills assessment:   Patient is knowledgeable in diabetes management concepts related to: Taking Medication    Based on learning assessment above, most appropriate setting for further diabetes education would be: Individual setting.    Care Plan and Education Provided:  Healthy Eating: Portion control  Being Active: Finding a physical activity routine that works for you  Monitoring: Blood glucose versus Continuous Glucose Monitoring and Log and interpret results  Taking Medication: Action of prescribed medication(s) and Side effects of prescribed medication(s)  Problem  Solving: High glucose - causes, signs/symptoms, treatment and prevention    Patient verbalized understanding of diabetes self-management education concepts discussed, opportunities for ongoing education and support, and recommendations provided today.    Plan     Aim to eat 3 meals/day, no longer than 4-5 hours between meals (except for the 12 hour fast overnight).  Aim to be as active as possible.  Ideally 20-30 minutes 5 days/week.  Take Jardiance and Mounjaro as prescribed.  Test your BG 1 time/day alternating testing times: fasting or before or 2 hrs after the start of the meal.  Record.  Wear the professional Dexcom cgms until 5/3/25.    6.  Follow up with Sofia Colvin as directed.  7.  Follow up with Diabetes Education 5/7/25 at 9am.  If you have questions you can send them through [employer] or call Diabetes Education Triage [phone].  For Scheduling call [phone].     Topics to cover at upcoming visits: Healthy Eating, Being Active, Monitoring, Taking Medication, Problem Solving, Reducing Risks, and Healthy Coping    Follow-up:  Upcoming Diabetes Ed Appointments     Visit Type Date Time Department    PROFESSIONAL CGM 4/23/2025  8:00 AM CL DIABETES ED    PROFESSIONAL CGM 5/7/2025  9:00 AM CL DIABETES ED        See Care Plan for co-developed, patient-state behavior change goals.    Education Materials Provided:  Managing Side effects of Mounjaro    Subjective/Objective  Frank is an 64 year old, presenting for the following diabetes education related to:    Cultural Influences/Ethnic Background:  Not  or       Diabetes Symptoms & Complications:  Diabetes Related Symptoms: (Patient-Rptd) Fatigue, Neuropathy, Polyuria (increased urination)  Weight trend: (Patient-Rptd) Decreasing       Patient Problem List and Family Medical History reviewed for relevant medical history, current medical status, and diabetes risk factors.    Vitals:  There were no vitals taken for this visit.  Estimated  "body mass index is 33.14 kg/m  as calculated from the following:    Height as of 4/11/25: 1.803 m (5' 11\").    Weight as of 4/11/25: 107.8 kg (237 lb 9.6 oz).   Last 3 BP:   BP Readings from Last 3 Encounters:   04/11/25 134/82   03/11/25 (!) 167/72   02/26/25 (!) 148/82       History   Smoking Status     Never   Smokeless Tobacco     Never       Labs:  Lab Results   Component Value Date    A1C 8.2 02/26/2025     Lab Results   Component Value Date     04/11/2025     02/04/2020     Lab Results   Component Value Date    LDL 93 06/20/2024     Direct Measure HDL   Date Value Ref Range Status   06/20/2024 37 (L) >=40 mg/dL Final   ]  GFR Estimate   Date Value Ref Range Status   04/11/2025 47 (L) >60 mL/min/1.73m2 Final     Comment:     eGFR calculated using 2021 CKD-EPI equation.   02/04/2020 82 >60 mL/min/[1.73_m2] Final     Comment:     Non  GFR Calc  Starting 12/18/2018, serum creatinine based estimated GFR (eGFR) will be   calculated using the Chronic Kidney Disease Epidemiology Collaboration   (CKD-EPI) equation.       GFR Estimate If Black   Date Value Ref Range Status   02/04/2020 >90 >60 mL/min/[1.73_m2] Final     Comment:      GFR Calc  Starting 12/18/2018, serum creatinine based estimated GFR (eGFR) will be   calculated using the Chronic Kidney Disease Epidemiology Collaboration   (CKD-EPI) equation.       Lab Results   Component Value Date    CR 1.64 04/11/2025    CR 1.00 02/04/2020     Lab Results   Component Value Date    MICROL 849.4 10/08/2024    UMALCR 1,187.97 (H) 10/08/2024    UCRR 110.8 02/04/2025 4/23/2025   Healthy Eating   Healthy Eating Assessed Today Yes   Cultural/[place] diet restrictions? No   Meal planning/habits Low salt;Low carb   Who cooks/prepares meals for you? Self;Spouse   Who purchases food in  your home? Self;Spouse   How many times a week on average do you eat food made away from home (restaurant/take-out)? 1   Meals include " Breakfast;Lunch;Dinner;Afternoon Snack   Breakfast 1 egg omlette w/ swiss cheese, greek yogurt w/ blueberries, water, coffee- black   Lunch popcorn chicken, fruit, coffee w/ 1/2 n 1/2   Dinner salt free: whole tomatoes, venison brats, mushrooms, onions   Snacks PM: fruit w/ cool whip; HS: generally not in the past 2 months (since staring Mounjaro)   Beverages Water;Diet soda;Coffee drinks;Coffee         4/23/2025   Being Active   Being Active Assessed Today Yes   Exercise: Currently not exercising   Barrier to exercise Other         4/23/2025   Monitoring   Monitoring Assessed Today Yes   Did patient bring glucose meter to appointment?  Yes   Blood Glucose Meter Reli-On   Times checking blood sugar at home (number) 2   Times checking blood sugar at home (per) Week   Blood glucose trend --   No recent data  Date Breakfast  Lunch  Dinner  Bedtime    Before After Before After Before After    ?     258          194      123                128          120          130        192         Diabetes Medication(s)       Sodium-Glucose Co-Transporter 2 (SGLT2) Inhibitors       empagliflozin (JARDIANCE) 25 MG TABS tablet Take 1 tablet (25 mg) by mouth daily.       Incretin Mimetic Agents       tirzepatide (MOUNJARO) 5 MG/0.5ML SOAJ auto-injector pen Inject 0.5 mLs (5 mg) subcutaneously every 7 days.              4/23/2025   Taking Medications   Taking Medication Assessed Today Yes   Current Treatments Non-insulin Injectables;Oral Medication (taken by mouth)   Problems taking diabetes medications regularly? No   Diabetes medication side effects? No          No data to display                  Encounter Type: Individual    Any diabetes medication dose changes were made via the [facility] Standing Orders under the patient's referring provider.    Diabetes Self-Management Education & Support    Assessment    Patient comments/concerns: Patient does not have any questions or concerns    Professional CGM study indicated for:        Intervention  Sensor started today.               Diabetes management related comments/concerns: interested in professional cgms  Sensor was inserted with no resistance or bleeding at insertion site.      Pt will plan to wear the sensor through 5/3/25    Dexcom Pro-specific education provided on: Sensor insertion, intention of monitoring for 10 days. Keep records of BG, food intake, exercise, and medication dosing during weather.   This is a blinded study.    Patient verbalized understanding of diabetes self-management education concepts discussed, opportunities for ongoing education and support, and recommendations provided today.    Educational and other materials:  Food/exercise/medication log sheets    Plan  Patient was given instructions for tracking blood glucose medications, food intake and activity.  Patient to return all items associated with the professional Continuous Glucose Monitor System.  See Patient Instructions.    Follow-up:  Upcoming Diabetes Ed Appointments     Visit Type Date Time Department    PROFESSIONAL CGM 4/23/2025  8:00 AM CL DIABETES ED    PROFESSIONAL CGM 5/7/2025  9:00 AM CL DIABETES ED        Subjective/Objective  Frank is an 64 year old, presenting for the following diabetes education related to: Individual review    Lab Results:  Lab Results   Component Value Date    A1C 8.2 02/26/2025       Medication:  Diabetes Medication(s)       Sodium-Glucose Co-Transporter 2 (SGLT2) Inhibitors       empagliflozin (JARDIANCE) 25 MG TABS tablet Take 1 tablet (25 mg) by mouth daily.       Incretin Mimetic Agents       tirzepatide (MOUNJARO) 5 MG/0.5ML SOAJ auto-injector pen Inject 0.5 mLs (5 mg) subcutaneously every 7 days.              YULISSA Sotomayor RD, [facility], 9:06 AM, 4/23/2025    Time spent in DSMT: 30 minutes   Time spent in CGM insertion: 20 minutes, in addition to time spent in DSMT  Encounter Type: Individual

## 2025-04-28 ENCOUNTER — PATIENT OUTREACH (OUTPATIENT)
Dept: CARE COORDINATION | Facility: CLINIC | Age: 65
End: 2025-04-28
Payer: COMMERCIAL

## 2025-05-06 ENCOUNTER — OFFICE VISIT (OUTPATIENT)
Dept: ORTHOPEDICS | Facility: CLINIC | Age: 65
End: 2025-05-06
Attending: FAMILY MEDICINE
Payer: COMMERCIAL

## 2025-05-06 DIAGNOSIS — N18.30 CHRONIC KIDNEY DISEASE, STAGE III (MODERATE) (H): Primary | ICD-10-CM

## 2025-05-06 DIAGNOSIS — M25.531 RIGHT WRIST PAIN: Primary | ICD-10-CM

## 2025-05-06 DIAGNOSIS — Z87.39 HISTORY OF GOUT: ICD-10-CM

## 2025-05-06 PROCEDURE — 99244 OFF/OP CNSLTJ NEW/EST MOD 40: CPT | Performed by: PEDIATRICS

## 2025-05-06 NOTE — LETTER
5/6/2025      Frank Mace  [address] [facility] [place] [address]      Dear Colleague,    Thank you for referring your patient, Frank Mace, to the [facility] [place]. Please see a copy of my visit note below.    ASSESSMENT & PLAN    Frank was seen today for pain.    Diagnoses and all orders for this visit:    Right wrist pain  -     Orthopedic  Referral  -     MR Wrist Right w/o Contrast; Future  -     Adult Rheumatology  Referral; Future    History of gout  -     Adult Rheumatology  Referral; Future    Suspect this is inflammatory condition.  We discussed MRI wrist to evaluate for MSK issue. If not conclusive, likely warrants more inflammatory work up / treatment. For that, could consider rheumatology, given past issues with left knee, left ankle, and now right wrist.  He is not particularly excited about more prednisone in the interim, though it was beneficial. Cannot use NSAIDs.  Declined letter for work at this time.  See below.  Questions answered. Discussed signs and symptoms that may indicate more serious issues; the patient was instructed to seek appropriate care if noted. Frank indicates understanding of these issues and agrees with the plan.        See Patient Instructions  Patient Instructions   Right wrist pain favored to be inflammatory given lack of trauma and x-ray findings, along with clinical course and exam findings. Possibly ongoing gout.  We discussed:  --additional imaging with MRI  --additional lab studies (including inflammatory markers, rheumatologic screening with history of ankle and knee issues as well; also imaging-guided joint aspiration if fluid present)  --referral to rheumatology  --bracing/compression for wrist comfort  --medication (use of allopurinol, use of oral steroid)    Plan wrist MRI to evaluate for musculoskeletal source for issues. If not present, then likely rheum evaluation vs rheum work up. Went ahead  and placed rheumatology referral as well, to get something on the books.    Advanced imaging is done by appointment. Please call [facility] ([facility]/[place]) [phone] , [facility] ([facility]/[place]) [phone] , or [facility] ([facility]) [phone]  to schedule your MRI.     Some insurance companies may require a prior authorization to be completed which can delay the time until you are able to schedule your appointment.       If you are active on [employer], you may have access to your test results before your provider is able to review the study and advise on next steps.      The clinic will plan to contact you with results either via phone or [employer]. If you have not heard from the clinic within 2-3 days following your MRI, please contact us at [phone] or via [employer].  Alternatively, if interested in further discussion with me about the findings and next steps, feel free to schedule a telephone visit, video visit, or recheck appointment in clinic.        If you have any further questions for your physician or physician s care team you can contact them thru [employer] or by calling [phone].        For questions about the cost of your visit, or the cost of any procedure, lab or imaging study, please contact our CONSUMER PRICE LINE at [phone] for an estimate.  You may also contact them at [email or web address]    You will be asked to provide your name, birthdate, address, phone number, and insurance information.  You will also need to know the name of any specific test or procedure which is planned.  This often requires the CPT (common procedural terminology) code for the test or procedure.  Our clinic staff can help you get that information, if needed.    For information about how your insurance will cover your clinic visit, please call the customer service number on your insurance card.    CPT:      [id number]       [facility]      CC: Sofia Colvin      -----  Chief Complaint   Patient presents with     Right Wrist - Pain       SUBJECTIVE  Frank Mace is a/an 64 year old male who is seen in consultation at the request of  Sofia Colvin D.O. for evaluation of right wrist.     The patient is seen by themselves.  The patient is Right handed    Onset: 1 years(s) ago. Reports insidious onset without acute precipitating event.  Location of Pain: right wrist, Diffuse wrist pain  Worsened by: Gripping, grasping, movement  Better with: Prednisone  Treatments tried: 3 courses of prednisone starting in March (just finished 3rd course 1 week ago), oxycodone on hand at home (notes didn't get much benefit)  Associated symptoms: Tender to the touch (couldn't wear a brace/wrap last night)    Orthopedic/Surgical history: Previous fracture noted in XR    [facility] on [facility]  Hx of gout in other places    Social History/Occupation: NA    **  Above information per rooming staff.  Additional history:    Recalls about a year ago a similar issue with left foot/ankle. Had XR, MRI, determined to be gout. Ultimately pt was able to ride that out.  Prior to that had left knee pain, evaluated at [facility], aspirated the knee. Also had steroid injection at that time.    Notes with use of prednisone has remarkably quick response, within hours. Then after takes last dose notes symptoms start to return in about a day.    With first episode with right wrist was more ulnar wrist, into mid forearm. Now is more diffuse ulnar and radial wrist.      REVIEW OF SYSTEMS:  Review of Systems    OBJECTIVE:        Hand/wrist (right):    Inspection:  No deformity noted.  Mild diffuse swelling, more ulnar and radial aspects, also some volar radial wrist area. No ecchymosis.    Motion:  Forearm pronation full, forearm supination mild-mod limitation with pain.  Wrist flexion mod limitation  with pain  Wrist extension mod limitation with pain  Wrist radial deviation with pain  Wrist ulnar deviation with pain  Digit motion grossly intact, no significant change    Strength:  Pain with resisted wrist motions above    Sensation:  Grossly intact light touch.    Radial pulses normal, +2/4, capillary refill brisk.    Palpation:  Tender dorsal wrist, ulnar wrist, ulnar fovea, snuffbox, volar radial wrist      RADIOLOGY:  Final results and radiologist's interpretation, available in the [facility] health record.  Images were reviewed with the patient in the office today.  My personal interpretation of the performed imaging: no acute bony abnormality noted. No significant degenerative change noted. Appearance of old healed 5th MC fracture.          EXAM: XR WRIST RIGHT G/E 3 VIEWS  LOCATION: [facility]  DATE: 4/11/2025     INDICATION: Right wrist pain  COMPARISON: None.                                                                      IMPRESSION: No acute fracture or malalignment. No significant degenerative changes. Remote healed fifth metacarpal fracture.        Lab:    Office Visit on 04/11/2025   Component Date Value Ref Range Status     Uric Acid 04/11/2025 8.4 (H)  3.4 - 7.0 mg/dL Final     Sodium 04/11/2025 140  135 - 145 mmol/L Final     Potassium 04/11/2025 4.5  3.4 - 5.3 mmol/L Final     Carbon Dioxide (CO2) 04/11/2025 21 (L)  22 - 29 mmol/L Final     Anion Gap 04/11/2025 16 (H)  7 - 15 mmol/L Final     Urea Nitrogen 04/11/2025 66.0 (H)  8.0 - 23.0 mg/dL Final     Creatinine 04/11/2025 1.64 (H)  0.67 - 1.17 mg/dL Final     GFR Estimate 04/11/2025 47 (L)  >60 mL/min/1.73m2 Final     Calcium 04/11/2025 9.8  8.8 - 10.4 mg/dL Final     Chloride 04/11/2025 103  98 - 107 mmol/L Final     Glucose 04/11/2025 190 (H)  70 - 99 mg/dL Final     Alkaline Phosphatase 04/11/2025 96  40 - 150 U/L Final     AST 04/11/2025 19  0 - 45 U/L Final     ALT 04/11/2025 19  0 - 70 U/L Final     Protein  Total 04/11/2025 6.9  6.4 - 8.3 g/dL Final     Albumin 04/11/2025 4.2  3.5 - 5.2 g/dL Final     Bilirubin Total 04/11/2025 0.7  <=1.2 mg/dL Final         Past uric acid elevated over course of 10 months, see Epic.               Again, thank you for allowing me to participate in the care of your patient.        Sincerely,        Kj Ley, DO    Electronically signed

## 2025-05-06 NOTE — PROGRESS NOTES
ASSESSMENT & PLAN    Frank was seen today for pain.    Diagnoses and all orders for this visit:    Right wrist pain  -     Orthopedic  Referral  -     MR Wrist Right w/o Contrast; Future  -     Adult Rheumatology  Referral; Future    History of gout  -     Adult Rheumatology  Referral; Future    Suspect this is inflammatory condition.  We discussed MRI wrist to evaluate for MSK issue. If not conclusive, likely warrants more inflammatory work up / treatment. For that, could consider rheumatology, given past issues with left knee, left ankle, and now right wrist.  He is not particularly excited about more prednisone in the interim, though it was beneficial. Cannot use NSAIDs.  Declined letter for work at this time.  See below.  Questions answered. Discussed signs and symptoms that may indicate more serious issues; the patient was instructed to seek appropriate care if noted. Frank indicates understanding of these issues and agrees with the plan.        See Patient Instructions  Patient Instructions   Right wrist pain favored to be inflammatory given lack of trauma and x-ray findings, along with clinical course and exam findings. Possibly ongoing gout.  We discussed:  --additional imaging with MRI  --additional lab studies (including inflammatory markers, rheumatologic screening with history of ankle and knee issues as well; also imaging-guided joint aspiration if fluid present)  --referral to rheumatology  --bracing/compression for wrist comfort  --medication (use of allopurinol, use of oral steroid)    Plan wrist MRI to evaluate for musculoskeletal source for issues. If not present, then likely rheum evaluation vs rheum work up. Went ahead and placed rheumatology referral as well, to get something on the books.    Advanced imaging is done by appointment. Please call [facility] ([facility]/[place]) [phone] , [facility] ([facility]/[place]) [phone] , or [facility] ([facility]/[place]/[facility]) [phone]  to schedule your MRI.     Some insurance companies may require a prior authorization to be completed which can delay the time until you are able to schedule your appointment.       If you are active on [employer], you may have access to your test results before your provider is able to review the study and advise on next steps.      The clinic will plan to contact you with results either via phone or [employer]. If you have not heard from the clinic within 2-3 days following your MRI, please contact us at [phone] or via [employer].  Alternatively, if interested in further discussion with me about the findings and next steps, feel free to schedule a telephone visit, video visit, or recheck appointment in clinic.        If you have any further questions for your physician or physician s care team you can contact them thru [employer] or by calling [phone].        For questions about the cost of your visit, or the cost of any procedure, lab or imaging study, please contact our CONSUMER PRICE LINE at [phone] for an estimate.  You may also contact them at [email or web address]    You will be asked to provide your name, birthdate, address, phone number, and insurance information.  You will also need to know the name of any specific test or procedure which is planned.  This often requires the CPT (common procedural terminology) code for the test or procedure.  Our clinic staff can help you get that information, if needed.    For information about how your insurance will cover your clinic visit, please call the customer service number on your insurance card.    CPT:     [id number]       [facility] [place]      CC: Sofia Colvin      -----  Chief Complaint   Patient presents with    Right Wrist - Pain       SUBJECTIVE  Frank Baxternatty is a/an 64 year  old male who is seen in consultation at the request of  Sofia Colvin D.O. for evaluation of right wrist.     The patient is seen by themselves.  The patient is Right handed    Onset: 1 years(s) ago. Reports insidious onset without acute precipitating event.  Location of Pain: right wrist, Diffuse wrist pain  Worsened by: Gripping, grasping, movement  Better with: Prednisone  Treatments tried: 3 courses of prednisone starting in March (just finished 3rd course 1 week ago), oxycodone on hand at home (notes didn't get much benefit)  Associated symptoms: Tender to the touch (couldn't wear a brace/wrap last night)    Orthopedic/Surgical history: Previous fracture noted in XR    [facility]  Hx of gout in other places    Social History/Occupation: NA    **  Above information per rooming staff.  Additional history:    Recalls about a year ago a similar issue with left foot/ankle. Had XR, MRI, determined to be gout. Ultimately pt was able to ride that out.  Prior to that had left knee pain, evaluated at [facility], aspirated the knee. Also had steroid injection at that time.    Notes with use of prednisone has remarkably quick response, within hours. Then after takes last dose notes symptoms start to return in about a day.    With first episode with right wrist was more ulnar wrist, into mid forearm. Now is more diffuse ulnar and radial wrist.      REVIEW OF SYSTEMS:  Review of Systems    OBJECTIVE:        Hand/wrist (right):    Inspection:  No deformity noted.  Mild diffuse swelling, more ulnar and radial aspects, also some volar radial wrist area. No ecchymosis.    Motion:  Forearm pronation full, forearm supination mild-mod limitation with pain.  Wrist flexion mod limitation with pain  Wrist extension mod limitation with pain  Wrist radial deviation with pain  Wrist ulnar deviation with pain  Digit motion grossly intact, no significant change    Strength:  Pain with resisted wrist motions  above    Sensation:  Grossly intact light touch.    Radial pulses normal, +2/4, capillary refill brisk.    Palpation:  Tender dorsal wrist, ulnar wrist, ulnar fovea, snuffbox, volar radial wrist      RADIOLOGY:  Final results and radiologist's interpretation, available in the [facility] health record.  Images were reviewed with the patient in the office today.  My personal interpretation of the performed imaging: no acute bony abnormality noted. No significant degenerative change noted. Appearance of old healed 5th MC fracture.          EXAM: XR WRIST RIGHT G/E 3 VIEWS  LOCATION: [facility]  DATE: 4/11/2025     INDICATION: Right wrist pain  COMPARISON: None.                                                                      IMPRESSION: No acute fracture or malalignment. No significant degenerative changes. Remote healed fifth metacarpal fracture.        Lab:    Office Visit on 04/11/2025   Component Date Value Ref Range Status    Uric Acid 04/11/2025 8.4 (H)  3.4 - 7.0 mg/dL Final    Sodium 04/11/2025 140  135 - 145 mmol/L Final    Potassium 04/11/2025 4.5  3.4 - 5.3 mmol/L Final    Carbon Dioxide (CO2) 04/11/2025 21 (L)  22 - 29 mmol/L Final    Anion Gap 04/11/2025 16 (H)  7 - 15 mmol/L Final    Urea Nitrogen 04/11/2025 66.0 (H)  8.0 - 23.0 mg/dL Final    Creatinine 04/11/2025 1.64 (H)  0.67 - 1.17 mg/dL Final    GFR Estimate 04/11/2025 47 (L)  >60 mL/min/1.73m2 Final    Calcium 04/11/2025 9.8  8.8 - 10.4 mg/dL Final    Chloride 04/11/2025 103  98 - 107 mmol/L Final    Glucose 04/11/2025 190 (H)  70 - 99 mg/dL Final    Alkaline Phosphatase 04/11/2025 96  40 - 150 U/L Final    AST 04/11/2025 19  0 - 45 U/L Final    ALT 04/11/2025 19  0 - 70 U/L Final    Protein Total 04/11/2025 6.9  6.4 - 8.3 g/dL Final    Albumin 04/11/2025 4.2  3.5 - 5.2 g/dL Final    Bilirubin Total 04/11/2025 0.7  <=1.2 mg/dL Final         Past uric acid elevated over course of 10 months, see Epic.

## 2025-05-06 NOTE — PATIENT INSTRUCTIONS
Right wrist pain favored to be inflammatory given lack of trauma and x-ray findings, along with clinical course and exam findings. Possibly ongoing gout.  We discussed:  --additional imaging with MRI  --additional lab studies (including inflammatory markers, rheumatologic screening with history of ankle and knee issues as well; also imaging-guided joint aspiration if fluid present)  --referral to rheumatology  --bracing/compression for wrist comfort  --medication (use of allopurinol, use of oral steroid)    Plan wrist MRI to evaluate for musculoskeletal source for issues. If not present, then likely rheum evaluation vs rheum work up. Went ahead and placed rheumatology referral as well, to get something on the books.    Advanced imaging is done by appointment. Please call [facility] ([facility]/[place]) [phone] , [facility] ([facility]/[place]) [phone] , or [facility] ([facility]) [phone]  to schedule your MRI.     Some insurance companies may require a prior authorization to be completed which can delay the time until you are able to schedule your appointment.       If you are active on [employer], you may have access to your test results before your provider is able to review the study and advise on next steps.      The clinic will plan to contact you with results either via phone or [employer]. If you have not heard from the clinic within 2-3 days following your MRI, please contact us at [phone] or via [employer].  Alternatively, if interested in further discussion with me about the findings and next steps, feel free to schedule a telephone visit, video visit, or recheck appointment in clinic.        If you have any further questions for your physician or physician s care team you can contact them thru [employer] or by calling [phone].        For questions about the cost of your visit, or the cost of any procedure, lab or  imaging study, please contact our CONSUMER PRICE LINE at [phone] for an estimate.  You may also contact them at [email or web address]    You will be asked to provide your name, birthdate, address, phone number, and insurance information.  You will also need to know the name of any specific test or procedure which is planned.  This often requires the CPT (common procedural terminology) code for the test or procedure.  Our clinic staff can help you get that information, if needed.    For information about how your insurance will cover your clinic visit, please call the customer service number on your insurance card.    CPT:     [id number]

## 2025-05-07 ENCOUNTER — DOCUMENTATION ONLY (OUTPATIENT)
Dept: FAMILY MEDICINE | Facility: CLINIC | Age: 65
End: 2025-05-07

## 2025-05-07 ENCOUNTER — TELEPHONE (OUTPATIENT)
Dept: FAMILY MEDICINE | Facility: CLINIC | Age: 65
End: 2025-05-07

## 2025-05-07 ENCOUNTER — PATIENT OUTREACH (OUTPATIENT)
Dept: CARE COORDINATION | Facility: CLINIC | Age: 65
End: 2025-05-07

## 2025-05-07 ENCOUNTER — OFFICE VISIT (OUTPATIENT)
Dept: EDUCATION SERVICES | Facility: CLINIC | Age: 65
End: 2025-05-07
Payer: COMMERCIAL

## 2025-05-07 DIAGNOSIS — E11.9 TYPE 2 DIABETES MELLITUS WITHOUT COMPLICATION, WITHOUT LONG-TERM CURRENT USE OF INSULIN (H): Primary | ICD-10-CM

## 2025-05-07 PROCEDURE — G0108 DIAB MANAGE TRN  PER INDIV: HCPCS | Mod: AE | Performed by: DIETITIAN, REGISTERED

## 2025-05-07 PROCEDURE — 95250 CONT GLUC MNTR PHYS/QHP EQP: CPT | Mod: AE | Performed by: DIETITIAN, REGISTERED

## 2025-05-07 NOTE — PROGRESS NOTES
Frank Mace has an upcoming lab appointment:    Future Appointments   Date Time Provider Department Center   5/13/2025  7:40 AM [id number]   5/27/2025  7:30 AM CL LAB [facility]   6/20/2025  9:00 AM Sofia Colvin, DO [facility]   6/27/2025  2:30 PM Aiden Salazar MD [facility]     Patient is scheduled for the following lab(s): A1C    There is no order available. Please review and place either future orders or HMPO (Review of Health Maintenance Protocol Orders), as appropriate.    Health Maintenance Due   Topic    ANNUAL REVIEW OF HM ORDERS      Alanis Dixon

## 2025-05-07 NOTE — LETTER
5/7/2025         RE: Frank Mace  [address] [place] [address]        Dear Colleague,    Thank you for referring your patient, Frank Mace, to the [facility]. Please see a copy of my visit note below.    Diabetes Self-Management Education & Support    Presents for:      Type of Service: In Person Visit      Assessment  -type 2 diabetes for ~13-14 years  -recent a1c of 8.2 (previous 5.9, in 2016 11.0)  -currently being managed with Jardiance, Mounjaro (Metformin caused upset stomach)  -on statin therapy  -diabetes complications:  CKD  -last seen by diabetes education 8/10/2016  -home BG monitoring reveals:  57% in target  (see readings below)  -on oral prednisone a couple times since early March due to gout    Primary concern: Last prednisone less than a week ago (5/1/25).  Tolerating the Mounjaro (bad heartburn the first 2 days).        Discussed: downloaded Dexcom pro, not ready to move forward with personal cgms,  increase of Mounjaro, limiting intake of purine foods, target BG and a1c, impact of prednisone on BG (cgms data shows that his BG is responding as typical with use of oral prednisone, discussed possible use of insulin in future if ongoing prednisone is needed),  care team (schedule with Primary Care Provider), resources.  Patient expressed understanding.      Patient's most recent   Lab Results   Component Value Date    A1C 8.2 02/26/2025     is not meeting goal of <7.0    Diabetes knowledge and skills assessment:   Patient is knowledgeable in diabetes management concepts related to: Healthy Eating, Being Active, and Taking Medication    Based on learning assessment above, most appropriate setting for further diabetes education would be: Individual setting.    Care Plan and Education Provided:  Healthy Eating: reducing purine due to gout  Taking Medication: Action of prescribed medication(s) and Side effects of prescribed medication(s)    Patient  "verbalized understanding of diabetes self-management education concepts discussed, opportunities for ongoing education and support, and recommendations provided today.    Plan    Aim to eat 3 meals/day, no longer than 4-5 hours between meals (except for the 12 hour fast overnight).  Aim to be as active as possible.  Ideally 20-30 minutes 5 days/week.  Take Jardiance as prescribed.  Increase the Mounjaro to 7.5 mg once weekly after your remaining 5 mg doses.    If you need to go on to prednisone again monitor your BG more frequently.  If you would want the personal cgms reach out.  Dexcom G7 is covered.    Test your BG 1 time/day alternating testing times: fasting or before or 2 hrs after the start of the meal.  Record.    7.  Follow up with Sofia Colvin as directed (end of May).  8.  Follow up with Diabetes Education as needed.  If you have questions you can send them through [employer] or call Diabetes Education Triage [phone].  For Scheduling call [phone].     Topics to cover at upcoming visits: Healthy Eating, Being Active, Monitoring, Taking Medication, Problem Solving, Reducing Risks, and Healthy Coping    See Care Plan for co-developed, patient-state behavior change goals.    Education Materials Provided:  Nutrition therapy: purine/gout management      Subjective/Objective  Frank is an 64 year old, presenting for the following diabetes education related to:       Cultural Influences/Ethnic Background:  Not  or     Diabetes Symptoms & Complications:          Patient Problem List and Family Medical History reviewed for relevant medical history, current medical status, and diabetes risk factors.    Vitals:  There were no vitals taken for this visit.  Estimated body mass index is 33.14 kg/m  as calculated from the following:    Height as of 4/11/25: 1.803 m (5' 11\").    Weight as of 4/11/25: 107.8 kg (237 lb 9.6 oz).   Last 3 BP:   BP Readings from Last 3 Encounters:   04/11/25 134/82 "   03/11/25 (!) 167/72   02/26/25 (!) 148/82       History   Smoking Status     Never   Smokeless Tobacco     Never       Labs:  Lab Results   Component Value Date    A1C 8.2 02/26/2025     Lab Results   Component Value Date     04/11/2025     02/04/2020     Lab Results   Component Value Date    LDL 93 06/20/2024     Direct Measure HDL   Date Value Ref Range Status   06/20/2024 37 (L) >=40 mg/dL Final     GFR Estimate   Date Value Ref Range Status   04/11/2025 47 (L) >60 mL/min/1.73m2 Final     Comment:     eGFR calculated using 2021 CKD-EPI equation.   02/04/2020 82 >60 mL/min/[1.73_m2] Final     Comment:     Non  GFR Calc  Starting 12/18/2018, serum creatinine based estimated GFR (eGFR) will be   calculated using the Chronic Kidney Disease Epidemiology Collaboration   (CKD-EPI) equation.       GFR Estimate If Black   Date Value Ref Range Status   02/04/2020 >90 >60 mL/min/[1.73_m2] Final     Comment:      GFR Calc  Starting 12/18/2018, serum creatinine based estimated GFR (eGFR) will be   calculated using the Chronic Kidney Disease Epidemiology Collaboration   (CKD-EPI) equation.       Lab Results   Component Value Date    CR 1.64 04/11/2025    CR 1.00 02/04/2020     Lab Results   Component Value Date    MICROL 849.4 10/08/2024    UMALCR 1,187.97 (H) 10/08/2024    UCRR 110.8 02/04/2025       Diabetes Medication(s)       Sodium-Glucose Co-Transporter 2 (SGLT2) Inhibitors       empagliflozin (JARDIANCE) 25 MG TABS tablet Take 1 tablet (25 mg) by mouth daily.       Incretin Mimetic Agents       tirzepatide (MOUNJARO) 5 MG/0.5ML SOAJ auto-injector pen Inject 0.5 mLs (5 mg) subcutaneously every 7 days.     Tirzepatide (MOUNJARO) 7.5 MG/0.5ML SOAJ auto-injector pen Inject 0.5 mLs (7.5 mg) subcutaneously once a week.              4/23/2025   Taking Medications   Taking Medication Assessed Today Yes   Current Treatments Non-insulin Injectables;Oral Medication (taken by mouth)        has been on Mounjaro 3+ months   Problems taking diabetes medications regularly? No   Diabetes medication side effects? No       but wonders if he has some reflux                 Kira Sotomayor RD, [facility], 11:57 AM, 5/7/2025    Time Spent: 35 minutes DSMT, 15 minutes cgms  Encounter Type: Individual    Any diabetes medication dose changes were made via the [facility] Standing Orders under the patient's referring provider.

## 2025-05-07 NOTE — TELEPHONE ENCOUNTER
Order/Referral Request    Who is requesting: Patient     Orders being requested: A1C    Reason service is needed/diagnosis: Per the recommendation of his dietician    When are orders needed by: 05-27-25    Has this been discussed with Provider: No only the dietician     Does patient have a preference on a Group/Provider/Facility?     Does patient have an appointment scheduled?: Yes: 05-27-25    Where to send orders: Place orders within Epic    Could we send this information to you in Grow or would you prefer to receive a phone call?:   Patient would like to be contacted via Grow

## 2025-05-07 NOTE — PATIENT INSTRUCTIONS
Plan    Aim to eat 3 meals/day, no longer than 4-5 hours between meals (except for the 12 hour fast overnight).  Aim to be as active as possible.  Ideally 20-30 minutes 5 days/week.  Take Jardiance as prescribed.  Increase the Mounjaro to 7.5 mg once weekly after your remaining 5 mg doses.    If you need to go on to prednisone again monitor your BG more frequently.  If you would want the personal cgms reach out.  Dexcom G7 is covered.    Test your BG 1 time/day alternating testing times: fasting or before or 2 hrs after the start of the meal.  Record.    7.  Follow up with Sofia Colvin as directed (end of May).  8.  Follow up with Diabetes Education as needed.  If you have questions you can send them through [employer] or call Diabetes Education Triage [phone].  For Scheduling call [phone].    Kira Sotomayor RD, [facility], 9:55 AM, 5/7/2025

## 2025-05-07 NOTE — PROGRESS NOTES
Diabetes Self-Management Education & Support    Presents for:      Type of Service: In Person Visit      Assessment  -type 2 diabetes for ~13-14 years  -recent a1c of 8.2 (previous 5.9, in 2016 11.0)  -currently being managed with Jardiance, Mounjaro (Metformin caused upset stomach)  -on statin therapy  -diabetes complications:  CKD  -last seen by diabetes education 8/10/2016  -home BG monitoring reveals:  57% in target  (see readings below)  -on oral prednisone a couple times since early March due to gout    Primary concern: here for download of professional cgms.   Last prednisone less than a week ago (5/1/25).  Tolerating the Mounjaro (bad heartburn the first 2 days).    Questions about purine impacting Gout.  Brings in food records.      Discussed: downloaded Dexcom pro, not ready to move forward with personal cgms,  increase of Mounjaro, food records (reveal 3 meals/day 4-10 ounces of meat /day, 30 grams of carbohydrate/meal), limiting intake of purine foods, target BG and a1c, impact of prednisone on BG (cgms data shows that his BG is responding as typical with use of oral prednisone, discussed possible use of insulin in future if ongoing prednisone is needed),  care team (schedule with Primary Care Provider), resources.  Patient expressed understanding.      Patient's most recent   Lab Results   Component Value Date    A1C 8.2 02/26/2025     is not meeting goal of <7.0    Diabetes knowledge and skills assessment:   Patient is knowledgeable in diabetes management concepts related to: Healthy Eating, Being Active, and Taking Medication    Based on learning assessment above, most appropriate setting for further diabetes education would be: Individual setting.    Care Plan and Education Provided:  Healthy Eating: reducing purine due to gout  Taking Medication: Action of prescribed medication(s) and Side effects of prescribed medication(s)    Patient verbalized understanding of diabetes self-management education  "concepts discussed, opportunities for ongoing education and support, and recommendations provided today.    Plan    Aim to eat 3 meals/day, no longer than 4-5 hours between meals (except for the 12 hour fast overnight).  Try to replace meats with other protein foods lower in purine.  Add more whole grains, fruits and vegetables to meals.    Aim to be as active as possible.  Ideally 20-30 minutes 5 days/week.  Take Jardiance as prescribed.  Increase the Mounjaro to 7.5 mg once weekly after your remaining 5 mg doses.    If you need to go on to prednisone again monitor your BG more frequently.  If you would want the personal cgms reach out.  Dexcom G7 is covered.    Test your BG 1 time/day alternating testing times: fasting or before or 2 hrs after the start of the meal.  Record.    7.  Follow up with Sofia Colvin as directed (end of May).  8.  Follow up with Diabetes Education as needed.  If you have questions you can send them through [employer] or call Diabetes Education Triage [phone].  For Scheduling call [phone].     Topics to cover at upcoming visits: Healthy Eating, Being Active, Monitoring, Taking Medication, Problem Solving, Reducing Risks, and Healthy Coping    See Care Plan for co-developed, patient-state behavior change goals.    Education Materials Provided:  Nutrition therapy: purine/gout management      Subjective/Objective  Frank is an 64 year old, presenting for the following diabetes education related to:       Cultural Influences/Ethnic Background:  Not  or     Diabetes Symptoms & Complications:          Patient Problem List and Family Medical History reviewed for relevant medical history, current medical status, and diabetes risk factors.    Vitals:  There were no vitals taken for this visit.  Estimated body mass index is 33.14 kg/m  as calculated from the following:    Height as of 4/11/25: 1.803 m (5' 11\").    Weight as of 4/11/25: 107.8 kg (237 lb 9.6 oz).   Last 3 BP: "   BP Readings from Last 3 Encounters:   04/11/25 134/82   03/11/25 (!) 167/72   02/26/25 (!) 148/82       History   Smoking Status    Never   Smokeless Tobacco    Never       Labs:  Lab Results   Component Value Date    A1C 8.2 02/26/2025     Lab Results   Component Value Date     04/11/2025     02/04/2020     Lab Results   Component Value Date    LDL 93 06/20/2024     Direct Measure HDL   Date Value Ref Range Status   06/20/2024 37 (L) >=40 mg/dL Final     GFR Estimate   Date Value Ref Range Status   04/11/2025 47 (L) >60 mL/min/1.73m2 Final     Comment:     eGFR calculated using 2021 CKD-EPI equation.   02/04/2020 82 >60 mL/min/[1.73_m2] Final     Comment:     Non  GFR Calc  Starting 12/18/2018, serum creatinine based estimated GFR (eGFR) will be   calculated using the Chronic Kidney Disease Epidemiology Collaboration   (CKD-EPI) equation.       GFR Estimate If Black   Date Value Ref Range Status   02/04/2020 >90 >60 mL/min/[1.73_m2] Final     Comment:      GFR Calc  Starting 12/18/2018, serum creatinine based estimated GFR (eGFR) will be   calculated using the Chronic Kidney Disease Epidemiology Collaboration   (CKD-EPI) equation.       Lab Results   Component Value Date    CR 1.64 04/11/2025    CR 1.00 02/04/2020     Lab Results   Component Value Date    MICROL 849.4 10/08/2024    UMALCR 1,187.97 (H) 10/08/2024    UCRR 110.8 02/04/2025       Diabetes Medication(s)       Sodium-Glucose Co-Transporter 2 (SGLT2) Inhibitors       empagliflozin (JARDIANCE) 25 MG TABS tablet Take 1 tablet (25 mg) by mouth daily.       Incretin Mimetic Agents       tirzepatide (MOUNJARO) 5 MG/0.5ML SOAJ auto-injector pen Inject 0.5 mLs (5 mg) subcutaneously every 7 days.     Tirzepatide (MOUNJARO) 7.5 MG/0.5ML SOAJ auto-injector pen Inject 0.5 mLs (7.5 mg) subcutaneously once a week.              4/23/2025   Taking Medications   Taking Medication Assessed Today Yes   Current Treatments  Non-insulin Injectables;Oral Medication (taken by mouth)       has been on Mounjaro 3+ months   Problems taking diabetes medications regularly? No   Diabetes medication side effects? No       but wonders if he has some reflux                 Healthy eating:  3 meals/day:    Breakfast: 2 hard boiled eggs, 2 Ozempic cheese, coffee w/ cream OR omlette, greek yogurt, berries  Lunch 1/2 brat, 1/2 cup berries, 1/2 c steel oats or 2.5 ounce turkey burger (no bun), grapes  PM snack: occasionally apple  Supper: turkey taco salad, ICE, 1/2 c blueberries or 4 Ozempic chicken breast, 1.5 ounce cheese, 9 grapes OR 6.7 ounce steak, 1.5 cups cauliflower/broccoli, ICE  HS: corine Sotomayor RD, [facility], 11:57 AM, 5/7/2025    Time Spent: 35 minutes DSMT, 15 minutes cgms  Encounter Type: Individual    Any diabetes medication dose changes were made via the [facility] Standing Orders under the patient's referring provider.

## 2025-05-13 ENCOUNTER — HOSPITAL ENCOUNTER (OUTPATIENT)
Dept: MRI IMAGING | Facility: CLINIC | Age: 65
Discharge: HOME | End: 2025-05-13
Attending: PEDIATRICS
Payer: COMMERCIAL

## 2025-05-13 DIAGNOSIS — M25.531 RIGHT WRIST PAIN: ICD-10-CM

## 2025-05-13 PROCEDURE — 73221 MRI JOINT UPR EXTREM W/O DYE: CPT | Mod: RT

## 2025-05-13 PROCEDURE — 73221 MRI JOINT UPR EXTREM W/O DYE: CPT | Mod: 26 | Performed by: RADIOLOGY

## 2025-05-15 ENCOUNTER — TELEPHONE (OUTPATIENT)
Dept: FAMILY MEDICINE | Facility: CLINIC | Age: 65
End: 2025-05-15
Payer: COMMERCIAL

## 2025-05-15 NOTE — TELEPHONE ENCOUNTER
Reason for call:  Other   Patient called regarding (reason for call): call back  Additional comments: patient has ongoing wrist pain issue- he had MRI but hasn't heard back  He is almost out of prednisone needs refilled  Is he also able to be prescribed a low dose prednisone for maintenance to help with pain until he sees rheumatologist and ortho  *around June 18th    Phone number to reach patient:  Home number on file [phone] (home)    Best Time:  any    Can we leave a detailed message on this number?  YES    Travel screening: Not Applicable

## 2025-05-15 NOTE — TELEPHONE ENCOUNTER
Frank notified and expressed understanding. He will contact Sports medicine for this.    Rekha Charlton RN

## 2025-05-15 NOTE — TELEPHONE ENCOUNTER
See note below. Patient wanting MRI results and prednisone until seen by rheumatology. Thank you!    Rekha Charlton RN

## 2025-05-15 NOTE — TELEPHONE ENCOUNTER
Sports medicine should be following up with patient on the MRI, recommend waiting for them to respond to the MRI. Dr. Colvin is out of clinic. Pain is unlikely caused from gout in the wrist. Chronic prednisone is not recommended.   CHRISSY Fermin CNP  Covering for Dr. Colvin

## 2025-05-19 DIAGNOSIS — D47.2 MGUS (MONOCLONAL GAMMOPATHY OF UNKNOWN SIGNIFICANCE): Primary | ICD-10-CM

## 2025-05-20 ENCOUNTER — TELEPHONE (OUTPATIENT)
Dept: ORTHOPEDICS | Facility: CLINIC | Age: 65
End: 2025-05-20
Payer: COMMERCIAL

## 2025-05-20 NOTE — TELEPHONE ENCOUNTER
FYI - Status Update    Who is Calling: patient    Update: FYI     FYI patient scheduled for USG injection of the right wrist    Per patient : I got a message in [employer] to schedule with a provider to do USG injection for right wrist.  (See [employer] message 5/20/25)    Patient scheduled with Dr. Choi on 5/21/25 USG right wrist MSK procedure appt scheduled at 3:20 pm at [facility] location.    Does caller want a call/response back: No

## 2025-05-21 ENCOUNTER — RESULTS FOLLOW-UP (OUTPATIENT)
Dept: ORTHOPEDICS | Facility: CLINIC | Age: 65
End: 2025-05-21

## 2025-05-21 ENCOUNTER — OFFICE VISIT (OUTPATIENT)
Dept: ORTHOPEDICS | Facility: CLINIC | Age: 65
End: 2025-05-21
Payer: COMMERCIAL

## 2025-05-21 DIAGNOSIS — M25.531 RIGHT WRIST PAIN: Primary | ICD-10-CM

## 2025-05-21 DIAGNOSIS — M24.131 DEGENERATIVE TFCC TEAR, RIGHT: ICD-10-CM

## 2025-05-21 DIAGNOSIS — M19.041 ARTHRITIS OF RIGHT HAND: ICD-10-CM

## 2025-05-21 RX ORDER — BETAMETHASONE SODIUM PHOSPHATE AND BETAMETHASONE ACETATE 3; 3 MG/ML; MG/ML
30 INJECTION, SUSPENSION INTRA-ARTICULAR; INTRALESIONAL; INTRAMUSCULAR; SOFT TISSUE
Status: COMPLETED | OUTPATIENT
Start: 2025-05-21 | End: 2025-05-21

## 2025-05-21 RX ORDER — ROPIVACAINE HYDROCHLORIDE 5 MG/ML
1 INJECTION, SOLUTION EPIDURAL; INFILTRATION; PERINEURAL
Status: COMPLETED | OUTPATIENT
Start: 2025-05-21 | End: 2025-05-21

## 2025-05-21 RX ADMIN — ROPIVACAINE HYDROCHLORIDE 1 ML: 5 INJECTION, SOLUTION EPIDURAL; INFILTRATION; PERINEURAL at 15:50

## 2025-05-21 RX ADMIN — BETAMETHASONE SODIUM PHOSPHATE AND BETAMETHASONE ACETATE 30 MG: 3; 3 INJECTION, SUSPENSION INTRA-ARTICULAR; INTRALESIONAL; INTRAMUSCULAR; SOFT TISSUE at 15:50

## 2025-05-21 NOTE — PATIENT INSTRUCTIONS
[facility] Injection Discharge Instructions    Procedure: right wrist TFCC cortisone injection    You may shower, however avoid swimming, tub baths or hot tubs for 24 hours following your procedure  You may have a mild to moderate increase in pain for several days following the injection.  It may take up to 14 days for the steroid medication to start working although you may feel the effect as early as a few days after the procedure.  You may use ice packs for 10-15 minutes, 3 to 4 times a day at the injection site for comfort  You may use anti-inflammatory medications (such as Ibuprofen or Aleve or Advil) or Tylenol for pain control if necessary and allowed to by your regular doctor  If you were fasting, you may resume your normal diet and medications after the procedure  If you have diabetes, check your blood sugar more frequently than usual as your blood sugar may be higher than normal for 10-14 days following a steroid injection. Contact your doctor who manages your diabetes if your blood sugar is higher than usual    If you experience any of the following, call [facility] @ [phone] or [phone]  - Fever over 100 degree F  - Swelling, bleeding, redness, drainage, warmth at the injection site  - New or worsening pain    Follow-up:   - Could repeat injection after 3 months if injection was helpful but pain returns  - If no relief after 2 weeks, could consider alternative injection or referral to hand surgery

## 2025-05-21 NOTE — PROGRESS NOTES
ASSESSMENT & PLAN    Frank was seen today for pain.    Diagnoses and all orders for this visit:    Right wrist pain    Degenerative TFCC tear, right  -     POC US GUIDANCE NEEDLE PLACEMENT; Future  -     Medium Joint Injection/Arthrocentesis    Arthritis of right hand      This issue is acute on chronic and Unchanged.    # Right wrist pain: Frank Mace  was seen today for right wrist pain evaluation and consideration of injection. Symptoms had been going on intermittently for 1 year. On examination there are positive findings of tenderness along the TFCC and pain along this area that radiates up the ulnar arm, across the wrist and into the 4th and 5th fingers with range of motion. 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     Likely cause of patient's condition due to both a chronic TFCC tear and the arthritic findings/ effusion of the carpal row. Currently, TFCC is the main area of pain today, as the other pain has settled down after taking a course of prednisone last week. Counseled patient on nature of condition and treatment options and we decided to proceed with a cortisone injection to the TFCC as below.    - Activity: activity as tolerated. TFCC brace as needed  - Ice, heat, massage as needed  - Medications:      - ibuprofen 600mg or diclofenac (voltaren) gel three times daily for 1-2 weeks, then as needed.      - tylenol 1000mg three times daily as needed  - Injections: tolerated TFCC injection today with good improvement of pain after    Follow-up:   - Could repeat injection after 3 months if injection was helpful but pain returns  - If no relief after 2 weeks, could consider alternative injection or referral to hand surgery      Arnaud Choi MD  [facility]    SUBJECTIVE- Interim History May 21, 2025    Chief Complaint    Patient presents with    Right Wrist - Pain       Frank Mace is a 64 year old male who is seen in f/u up for    Right wrist pain  Degenerative TFCC tear, right  Arthritis of right hand. Since last visit on 5/6/25 patient has had improvement of pain, demetris over dorsum after completing cortisone taper.    Onset: 1 years(s) ago. Reports insidious onset without acute precipitating event.  Location of Pain: right wrist, Diffuse wrist pain  Worsened by: Gripping, grasping, movement  Better with: Prednisone  Treatments tried: 3 courses of prednisone starting in March (just finished 4th course last weekend), oxycodone on hand at home (notes didn't get much benefit)  Associated symptoms: Tender to the touch     Orthopedic/Surgical history: Previous fracture noted in XR     [facility] visit on 3/11/25  Hx of gout in other places     Social History/Occupation: NA         REVIEW OF SYSTEMS:  Review of Systems    OBJECTIVE:  There were no vitals taken for this visit.   General: healthy, alert and in no distress  Skin: no suspicious lesions or rash.  CV: distal perfusion intact   Resp: normal respiratory effort without conversational dyspnea   Psych: normal mood and affect  Gait: NORMAL  Neuro: Normal light sensory exam of right upper extremity    RIGHT WRIST  Inspection:    No swelling or obvious deformity or asymmetry  Palpation:    Tender about the TFCC>> carpal row. Remainder of bony and ligamentous line marks are nontender.   Range of Motion:    (active and passive) flexion, extension, pronation/supination, and ulnar/radial deviation all mildly limited due to pain, swelling  Strength:    No deficits in flexion, extension, ulnar/radial deviation, or  strength.. Normal pinch strength.  Special Tests:    Positive: TFCC grind      RADIOLOGY:  Final results and radiologist's interpretation, available in the [facility] health record.  Images were reviewed with the patient in the office today.  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          Hemoglobin A1C   Date Value Ref Range Status   02/26/2025 8.2 (H) 0.0 - 5.6 % Final     Comment:     Normal <5.7%   Prediabetes 5.7-6.4%    Diabetes 6.5% or higher     Note: Adopted from ADA consensus guidelines.     Patient on blood thinners: No    Medium Joint Injection/Arthrocentesis    Date/Time: 5/21/2025 3:50 PM    Performed by: Arnaud Choi MD  Authorized by: Arnaud Choi MD    Indications:  Pain  Needle Size:  25 G  Guidance: ultrasound    Approach:  Medial  Location:  Wrist  Location comment:  R TFCC  Medications:  1 mL ROPivacaine 5 MG/ML; 30 mg betamethasone acet & sod phos 6 (3-3) MG/ML  Outcome:  Tolerated well, no immediate complications  Procedure discussed: discussed risks, benefits, and alternatives    Consent Given by:  Patient  Timeout: timeout called immediately prior to procedure    Prep: patient was prepped and draped in usual sterile fashion     Patient tolerated R TFCC cortisone injection. Ultrasound guidance was required to ensure correct needle placement for injection and avoid vital surrounding structures. Ultrasound guided images were permanently stored. Aftercare instructions given to patient. Plan to follow-up as above.     Arnaud Choi MD    Patient was independently assessed by me and was deemed appropriate for this procedure. Risks and benefits were discussed with good understanding including, but not limited to, the risks of bleeding, reaction to the medication, infection, potential delay of any surgical intervention by 3 months, temporary elevation of blood sugars, worsening or rapid acceleration of arthritis, and the possibility of the treatment being ineffective. The patient tolerated the procedure well with no complications.    After visit care  instructions were discussed in person and provided in AVS.

## 2025-05-21 NOTE — LETTER
5/21/2025      Frank Mace  [address] [place] [address]      Dear Colleague,    Thank you for referring your patient, Frank Mace, to the [facility]. Please see a copy of my visit note below.    ASSESSMENT & PLAN    Frank was seen today for pain.    Diagnoses and all orders for this visit:    Right wrist pain    Degenerative TFCC tear, right  -     POC US GUIDANCE NEEDLE PLACEMENT; Future  -     Medium Joint Injection/Arthrocentesis    Arthritis of right hand      This issue is acute on chronic and Unchanged.    # Right wrist pain: Frank Mace  was seen today for right wrist pain evaluation and consideration of injection. Symptoms had been going on intermittently for 1 year. On examination there are positive findings of tenderness along the TFCC and pain along this area that radiates up the ulnar arm, across the wrist and into the 4th and 5th fingers with range of motion. 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     Likely cause of patient's condition due to both a chronic TFCC tear and the arthritic findings/ effusion of the carpal row. Currently, TFCC is the main area of pain today, as the other pain has settled down after taking a course of prednisone last week. Counseled patient on nature of condition and treatment options and we decided to proceed with a cortisone injection to the TFCC as below.    - Activity: activity as tolerated. TFCC brace as needed  - Ice, heat, massage as needed  - Medications:      - ibuprofen 600mg or diclofenac (voltaren) gel three times daily for 1-2 weeks, then as needed.      - tylenol 1000mg three times daily as needed  - Injections: tolerated TFCC injection today with good improvement of pain after    Follow-up:   - Could repeat injection after 3 months if injection was helpful  but pain returns  - If no relief after 2 weeks, could consider alternative injection or referral to hand surgery      Arnaud Choi MD  [facility]    SUBJECTIVE- Interim History May 21, 2025    Chief Complaint   Patient presents with     Right Wrist - Pain       Frank Mace is a 64 year old male who is seen in f/u up for    Right wrist pain  Degenerative TFCC tear, right  Arthritis of right hand. Since last visit on 5/6/25 patient has had improvement of pain, demetris over dorsum after completing cortisone taper.    Onset: 1 years(s) ago. Reports insidious onset without acute precipitating event.  Location of Pain: right wrist, Diffuse wrist pain  Worsened by: Gripping, grasping, movement  Better with: Prednisone  Treatments tried: 3 courses of prednisone starting in March (just finished 4th course last weekend), oxycodone on hand at home (notes didn't get much benefit)  Associated symptoms: Tender to the touch     Orthopedic/Surgical history: Previous fracture noted in XR     [facility] visit on 3/11/25  Hx of gout in other places     Social History/Occupation: NA         REVIEW OF SYSTEMS:  Review of Systems    OBJECTIVE:  There were no vitals taken for this visit.   General: healthy, alert and in no distress  Skin: no suspicious lesions or rash.  CV: distal perfusion intact   Resp: normal respiratory effort without conversational dyspnea   Psych: normal mood and affect  Gait: NORMAL  Neuro: Normal light sensory exam of right upper extremity    RIGHT WRIST  Inspection:    No swelling or obvious deformity or asymmetry  Palpation:    Tender about the TFCC>> carpal row. Remainder of bony and ligamentous line marks are nontender.   Range of Motion:    (active and passive) flexion, extension, pronation/supination, and ulnar/radial deviation all mildly limited due to pain, swelling  Strength:    No deficits in flexion, extension, ulnar/radial deviation, or  strength.. Normal pinch  strength.  Special Tests:    Positive: TFCC grind      RADIOLOGY:  Final results and radiologist's interpretation, available in the [facility] health record.  Images were reviewed with the patient in the office today.  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          Hemoglobin A1C   Date Value Ref Range Status   02/26/2025 8.2 (H) 0.0 - 5.6 % Final     Comment:     Normal <5.7%   Prediabetes 5.7-6.4%    Diabetes 6.5% or higher     Note: Adopted from ADA consensus guidelines.     Patient on blood thinners: No    Medium Joint Injection/Arthrocentesis    Date/Time: 5/21/2025 3:50 PM    Performed by: Arnaud Choi MD  Authorized by: Arnaud Choi MD    Indications:  Pain  Needle Size:  25 G  Guidance: ultrasound    Approach:  Medial  Location:  Wrist  Location comment:  R TFCC  Medications:  1 mL ROPivacaine 5 MG/ML; 30 mg betamethasone acet & sod phos 6 (3-3) MG/ML  Outcome:  Tolerated well, no immediate complications  Procedure discussed: discussed risks, benefits, and alternatives    Consent Given by:  Patient  Timeout: timeout called immediately prior to procedure    Prep: patient was prepped and draped in usual sterile fashion     Patient tolerated R TFCC cortisone injection. Ultrasound guidance was required to ensure correct needle placement for injection and avoid vital surrounding structures. Ultrasound guided images were permanently stored. Aftercare instructions given to patient. Plan to follow-up as above.     Arnaud Choi MD    Patient was independently assessed by me and was deemed appropriate for this procedure. Risks and benefits were discussed with good understanding including, but not limited to, the risks of bleeding, reaction to the medication, infection, potential delay of any surgical  intervention by 3 months, temporary elevation of blood sugars, worsening or rapid acceleration of arthritis, and the possibility of the treatment being ineffective. The patient tolerated the procedure well with no complications.    After visit care instructions were discussed in person and provided in AVS.          Again, thank you for allowing me to participate in the care of your patient.        Sincerely,        Arnaud Choi MD    Electronically signed

## 2025-05-27 ENCOUNTER — TELEPHONE (OUTPATIENT)
Dept: ORTHOPEDICS | Facility: CLINIC | Age: 65
End: 2025-05-27

## 2025-05-27 ENCOUNTER — LAB (OUTPATIENT)
Dept: LAB | Facility: CLINIC | Age: 65
End: 2025-05-27
Payer: COMMERCIAL

## 2025-05-27 DIAGNOSIS — N18.30 STAGE 3 CHRONIC KIDNEY DISEASE, UNSPECIFIED WHETHER STAGE 3A OR 3B CKD (H): ICD-10-CM

## 2025-05-27 DIAGNOSIS — E11.9 TYPE 2 DIABETES MELLITUS WITHOUT COMPLICATION, WITHOUT LONG-TERM CURRENT USE OF INSULIN (H): ICD-10-CM

## 2025-05-27 DIAGNOSIS — N18.30 CHRONIC KIDNEY DISEASE, STAGE III (MODERATE) (H): ICD-10-CM

## 2025-05-27 DIAGNOSIS — M1A.9XX0 CHRONIC GOUT WITHOUT TOPHUS, UNSPECIFIED CAUSE, UNSPECIFIED SITE: ICD-10-CM

## 2025-05-27 LAB
ALBUMIN MFR UR ELPH: 109.9 MG/DL
ALBUMIN SERPL BCG-MCNC: 3.8 G/DL (ref 3.5–5.2)
ALBUMIN UR-MCNC: >=300 MG/DL
ANION GAP SERPL CALCULATED.3IONS-SCNC: 13 MMOL/L (ref 7–15)
APPEARANCE UR: CLEAR
BILIRUB UR QL STRIP: NEGATIVE
BUN SERPL-MCNC: 33.3 MG/DL (ref 8–23)
CALCIUM SERPL-MCNC: 9.5 MG/DL (ref 8.8–10.4)
CHLORIDE SERPL-SCNC: 103 MMOL/L (ref 98–107)
COLOR UR AUTO: YELLOW
CREAT SERPL-MCNC: 1.51 MG/DL (ref 0.67–1.17)
CREAT UR-MCNC: 114.2 MG/DL
EGFRCR SERPLBLD CKD-EPI 2021: 52 ML/MIN/1.73M2
EST. AVERAGE GLUCOSE BLD GHB EST-MCNC: 186 MG/DL
GLUCOSE SERPL-MCNC: 136 MG/DL (ref 70–99)
GLUCOSE UR STRIP-MCNC: 500 MG/DL
HBA1C MFR BLD: 8.1 % (ref 0–5.6)
HCO3 SERPL-SCNC: 21 MMOL/L (ref 22–29)
HGB UR QL STRIP: ABNORMAL
KETONES UR STRIP-MCNC: NEGATIVE MG/DL
LEUKOCYTE ESTERASE UR QL STRIP: NEGATIVE
NITRATE UR QL: NEGATIVE
PH UR STRIP: 5 [PH] (ref 5–7)
PHOSPHATE SERPL-MCNC: 3.2 MG/DL (ref 2.5–4.5)
POTASSIUM SERPL-SCNC: 4.2 MMOL/L (ref 3.4–5.3)
PROT/CREAT 24H UR: 0.96 MG/MG CR (ref 0–0.2)
RBC #/AREA URNS AUTO: NORMAL /HPF
SODIUM SERPL-SCNC: 137 MMOL/L (ref 135–145)
SP GR UR STRIP: 1.01 (ref 1–1.03)
UROBILINOGEN UR STRIP-ACNC: 0.2 E.U./DL
WBC #/AREA URNS AUTO: NORMAL /HPF

## 2025-05-27 PROCEDURE — 84156 ASSAY OF PROTEIN URINE: CPT

## 2025-05-27 PROCEDURE — 36415 COLL VENOUS BLD VENIPUNCTURE: CPT

## 2025-05-27 PROCEDURE — 81001 URINALYSIS AUTO W/SCOPE: CPT

## 2025-05-27 PROCEDURE — 80069 RENAL FUNCTION PANEL: CPT

## 2025-05-27 PROCEDURE — 83036 HEMOGLOBIN GLYCOSYLATED A1C: CPT

## 2025-05-27 NOTE — LETTER
May 27, 2025      Frank Mace  [address]  [place] [address]        To Whom It May Concern:    Frank Mace was seen in our clinic. He may return to work without restrictions as tolerated.      Sincerely,        Arnaud Choi MD    Electronically signed

## 2025-05-27 NOTE — TELEPHONE ENCOUNTER
Form or Letter   Type or form/letter needing completion: Letter to return to work tomorrow. To note if any restrictions apply.   Provider: Dr. Arnaud Choi  Date form needed: 5/27/25  Once completed: Letter in [employer]    Could we send this information to you in [employer] or would you prefer to receive a phone call?:   Patient would prefer a phone call   Okay to leave a detailed message?: Yes at Cell number on file:    Telephone Information:   Mobile [phone]

## 2025-05-27 NOTE — TELEPHONE ENCOUNTER
Called and spoke to patient. He reports:  -wrist is ~90% improved- no pain, feels tight  -wearing neoprene sleeve on wrist  -works at [employer] - , no heavy lifting  -ready to return to work as tolerated    Work letter pended.  Yulissa Alegria, LAT, ATC

## 2025-06-02 ENCOUNTER — TELEPHONE (OUTPATIENT)
Dept: FAMILY MEDICINE | Facility: CLINIC | Age: 65
End: 2025-06-02
Payer: COMMERCIAL

## 2025-06-02 DIAGNOSIS — M10.9 ACUTE GOUT OF MULTIPLE SITES, UNSPECIFIED CAUSE: ICD-10-CM

## 2025-06-02 DIAGNOSIS — M1A.9XX0 CHRONIC GOUT WITHOUT TOPHUS, UNSPECIFIED CAUSE, UNSPECIFIED SITE: Primary | ICD-10-CM

## 2025-06-02 RX ORDER — PREDNISONE 20 MG/1
TABLET ORAL
Qty: 20 TABLET | Refills: 0 | Status: SHIPPED | OUTPATIENT
Start: 2025-06-02

## 2025-06-02 NOTE — TELEPHONE ENCOUNTER
I can send over a prednisone taper. Please have him do a virtual appt with me on Wednesday to discuss pain and his mounjaro. Thank you!    Sofia Colvin, DO

## 2025-06-02 NOTE — TELEPHONE ENCOUNTER
Called and informed patient of the provider's instructions.     Patient agrees with plan and expressed understanding.    RN scheduled a telephone visit for patient on 6/4/25.    Dana Hi RN on 6/2/2025 at 4:07 PM

## 2025-06-02 NOTE — TELEPHONE ENCOUNTER
"1.) Pt calling to report \"My pain is getting worse..\"    Pt has Rheumatology appt scheduled 6-18-25  PCP follow up appt  is scheduled for 6-20-25.    He was given \"4 days worth of Prednisone today\" by his Kidney Provider. (5th dose now, per pt).    \"I was seen for right wrist pain and I have been on Prednisone several times now and I had a cortisone shot. The sports Provider wants me to be seen by Rheumatology, which I have an appt scheduled but can't get in until 6-18-25. My wrist really hurts. 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 I can't take NSAIDS- I have kidney issues. I am sitting in the parking lot of my kidney gasper right now. The kidney provider gave me 4 days worth of Prednisone. 40 mg-no taper..\"     ~~~~~~~~~~~~~~~~~~~~~~~~~~~~~~~~~~~~~~~~~~~~~~~~~~~~~~~~~~~~~~~~~~~~~~~~  2.)  I also need to talk to her about Mounjaro, too. Ever since the dose was increased to 7.5 mg I have been having diarrhea. I have taken it twice, the first week I had extreme diarrhea and stomach pain as well as extreme heartburn and my stomach felt bloated and hard.     I took it again on Saturday and I have had about 30 loose stools and I need to go back tot he lower dose. I am good today. I haven't eaten much, I am drinking 2 to 2-1/2 liters of fluid a day and I am urinating. After 2 or 3 days, I am fine, but I think I need to go back tot he 5 mg dose..\"    Please advise. Alexandrea Pace RN    "

## 2025-06-04 ENCOUNTER — OFFICE VISIT (OUTPATIENT)
Dept: RHEUMATOLOGY | Facility: CLINIC | Age: 65
End: 2025-06-04
Attending: PEDIATRICS
Payer: COMMERCIAL

## 2025-06-04 ENCOUNTER — LAB (OUTPATIENT)
Dept: LAB | Facility: CLINIC | Age: 65
End: 2025-06-04
Payer: COMMERCIAL

## 2025-06-04 ENCOUNTER — VIRTUAL VISIT (OUTPATIENT)
Dept: FAMILY MEDICINE | Facility: CLINIC | Age: 65
End: 2025-06-04
Payer: COMMERCIAL

## 2025-06-04 VITALS
DIASTOLIC BLOOD PRESSURE: 56 MMHG | OXYGEN SATURATION: 97 % | WEIGHT: 228.2 LBS | HEIGHT: 71 IN | SYSTOLIC BLOOD PRESSURE: 120 MMHG | HEART RATE: 77 BPM | BODY MASS INDEX: 31.95 KG/M2

## 2025-06-04 DIAGNOSIS — Z87.39 HISTORY OF GOUT: ICD-10-CM

## 2025-06-04 DIAGNOSIS — E11.9 TYPE 2 DIABETES MELLITUS WITHOUT COMPLICATION, WITHOUT LONG-TERM CURRENT USE OF INSULIN (H): ICD-10-CM

## 2025-06-04 DIAGNOSIS — E79.0 HYPERURICEMIA: ICD-10-CM

## 2025-06-04 DIAGNOSIS — M10.9 ACUTE GOUT OF MULTIPLE SITES, UNSPECIFIED CAUSE: ICD-10-CM

## 2025-06-04 DIAGNOSIS — Z71.89 ENCOUNTER TO DISCUSS TREATMENT OPTIONS: Primary | ICD-10-CM

## 2025-06-04 DIAGNOSIS — M25.531 RIGHT WRIST PAIN: ICD-10-CM

## 2025-06-04 DIAGNOSIS — M10.9 ACUTE GOUT OF MULTIPLE SITES, UNSPECIFIED CAUSE: Primary | ICD-10-CM

## 2025-06-04 LAB
ERYTHROCYTE [DISTWIDTH] IN BLOOD BY AUTOMATED COUNT: 14.3 % (ref 10–15)
ERYTHROCYTE [SEDIMENTATION RATE] IN BLOOD BY WESTERGREN METHOD: 51 MM/HR (ref 0–20)
HCT VFR BLD AUTO: 33.2 % (ref 40–53)
HGB BLD-MCNC: 11.4 G/DL (ref 13.3–17.7)
MCH RBC QN AUTO: 30.3 PG (ref 26.5–33)
MCHC RBC AUTO-ENTMCNC: 34.3 G/DL (ref 31.5–36.5)
MCV RBC AUTO: 88 FL (ref 78–100)
PLATELET # BLD AUTO: 390 10E3/UL (ref 150–450)
RBC # BLD AUTO: 3.76 10E6/UL (ref 4.4–5.9)
WBC # BLD AUTO: 22.1 10E3/UL (ref 4–11)

## 2025-06-04 PROCEDURE — 84460 ALANINE AMINO (ALT) (SGPT): CPT

## 2025-06-04 PROCEDURE — 85652 RBC SED RATE AUTOMATED: CPT

## 2025-06-04 PROCEDURE — 99204 OFFICE O/P NEW MOD 45 MIN: CPT | Performed by: INTERNAL MEDICINE

## 2025-06-04 PROCEDURE — 86431 RHEUMATOID FACTOR QUANT: CPT

## 2025-06-04 PROCEDURE — 82565 ASSAY OF CREATININE: CPT

## 2025-06-04 PROCEDURE — 98006 SYNCH AUDIO-VIDEO EST MOD 30: CPT | Performed by: FAMILY MEDICINE

## 2025-06-04 PROCEDURE — 85027 COMPLETE CBC AUTOMATED: CPT

## 2025-06-04 PROCEDURE — 86140 C-REACTIVE PROTEIN: CPT

## 2025-06-04 PROCEDURE — 36415 COLL VENOUS BLD VENIPUNCTURE: CPT

## 2025-06-04 PROCEDURE — 84550 ASSAY OF BLOOD/URIC ACID: CPT

## 2025-06-04 RX ORDER — COLCHICINE 0.6 MG/1
0.6 TABLET ORAL 2 TIMES DAILY
Qty: 60 TABLET | Refills: 1 | Status: SHIPPED | OUTPATIENT
Start: 2025-06-04

## 2025-06-04 RX ORDER — ALLOPURINOL 300 MG/1
300 TABLET ORAL DAILY
Qty: 30 TABLET | Refills: 1 | Status: SHIPPED | OUTPATIENT
Start: 2025-06-04

## 2025-06-04 NOTE — PROGRESS NOTES
This document was created using a software with less than 100% fidelity, at times resulting in unintended, even erroneous syntax and grammar.  The reader is advised to keep this under consideration while reviewing, interpreting this note.      Rheumatology Consult Note      Frank Mace     YOB: 1960 Age: 64 year old    Date of visit: 6/04/25    PCP: Sofia Colvin    Chief Complaint   Frequent flares of gout (wrists, ankles, feet), hyperuricemia.  Currently on 200 mg allopurinol daily but uric acid still high (8.4)          Assessment and Plan   1.  The clinical picture is suggestive of gout given very high uric acid level (baseline uric acid 10.4).  However we will check his rheumatoid serologies (RF, CCP) to rule out RA as a potential confounding factor.    2.We had a detailed discussion about gout and its management.  Gout is a form of inflammatory arthritis resulting from the deposition of uric acid crystals in the joints due to high blood uric acid levels (hyperuricemia) resulting in acute inflammation (pain, swelling, redness) of the joints.  Uric acid level can be high in some people because of overproduction of uric acid by the body or decreased excretion of uric acid by the kidneys or a combination of both.  We discussed both lifestyle modification (diet, weight loss, avoiding or reducing alcohol) as well as pharmacologic interventions to control and prevent gout.  The KEY to controlling gout lies in KEEPING THE URIC ACID LEVEL ATLEAST BELOW 6 mg/dl ON A CONSISTENT LEVEL modification especially reducing high fructose corn syrup in the diet, eliminating or at the very minimum moderating alcohol consumption, weight loss (weight gain with the body to produce more uric acid), and minimizing (not necessarily completely at eliminating) certain foods like shellfish, red meat etc. were discussed.  Medications like allopurinol and Uloric lower the blood uric acid level by reducing the  production of uric acid in the body.  We also discussed colchicine, which does not lower the uric acid but prevents the white cells in the joints from ingesting gouty crystals and causing inflammation.  Colchicine is used mainly for treating ACUTE GOUTY FLARES and for prevention of gout while the allopurinol dose is being adjusted.    He will INCREASE ALLOPURINOL DOSE  MG DAILY    I am also starting him on colchicine, to better control his gout while the allopurinol dose is being titrated so that he does not have to go on steroids so frequently (steroids affecting his diabetes, causing high sugars).    Once his gout has been in remission for a couple of months and the uric acid levels are consistently below 6 Mg/DL, we will phase out colchicine.    3.  Dietary aspects of gout discussed, he does not drink alcohol much (a couple of drinks a year).    4.  His dad had psoriasis (and gout).  Psoriatic arthritis also considered but there is no blood test for psoriatic arthritis.  If he continues to have joint inflammation despite the uric acid level being brought down below 6 Mg/DL then psoriatic arthritis could be a potential factor II.    Follow-up in 1 month, he will have a uric acid, creatinine, ALT and CBC checked a couple of days prior to his next appointment.    CC PCP            VANCE     Frank is a very nice 64-year-old gentleman with a history of diabetes, hyperuricemia.  In 2024 he underwent a right knee arthroplasty, subsequently he started noticing intense acute flares of painful joint swelling involving his ankles, feet, the right wrist.  He has been on at least 5 courses of prednisone over the past 6-8 months, the most recent one being a couple of days ago.  His baseline uric acid was high (10.4).  About 4-5 months ago his PCP started him on allopurinol (initially 50 mg, then 100 mg, currently 200 mg allopurinol daily).  His uric acid did come down to 9.6, 8.4 but is still high.  The goal would be  "keeping the uric acid level below 6 Mg/DL for optimum gout control.  He has had painful swelling of both ankles, the right wrist multiple times since his first attack last year.  His dad also had gout.  Brother also had gout.  Family history is negative for rheumatoid arthritis.  His dad had psoriasis.  During the acute episodes of joint flare the pain is intense and he \"can hardly walk \".  In between episodes the feet and ankles are comfortable but the right wrist hurts persistently, as he comes off prednisone.  He is on his fifth course of prednisone.  He has diabetes for 10 years and prednisone is affecting his diabetes control.  He has seen podiatry who did MRI of the feet which were nondiagnostic.    Labs: Uric acid 8.4 (prior uric acid 9.6, 10.4), creatinine 1.51 (range 1.41-1.64), calcium 9.5, albumin 3.8, TSH 1.89, WBC 9.1, hemoglobin 13.2, platelets 233.    Social history.  Alcohol use is rare (1-2 drinks a year).  He works full-time [employer] for [employer].           Active Problem List     Patient Active Problem List   Diagnosis    Arthrosis     Past Medical History   No past medical history on file.  Past Surgical History   No past surgical history on file.  Allergy     Allergies   Allergen Reactions    Codeine Nausea     And dizzy    Iodinated Contrast Media Other (See Comments)     Avoids for kidney disease    Lactose     Peanuts [Nuts] Other (See Comments)     Burps for days     Current Medication List     Current Outpatient Medications   Medication Sig Dispense Refill    allopurinol (ZYLOPRIM) 100 MG tablet 100 mg daily.      ALPHA LIPOIC ACID PO Take 2 tablets by mouth daily.      amLODIPine (NORVASC) 5 MG tablet Take 1 tablet (5 mg) by mouth daily 90 tablet 3    Ascorbic Acid (VITAMIN C PO) Take 2 tablets by mouth daily.      atorvastatin (LIPITOR) 40 MG tablet Take 1 tablet (40 mg) by mouth every evening 90 tablet 3    empagliflozin (JARDIANCE) 25 MG TABS tablet Take 1 tablet (25 " mg) by mouth daily. 90 tablet 1    Ferrous Fumarate (IRON) 18 MG TBCR       hydrochlorothiazide (HYDRODIURIL) 25 MG tablet Take 1 tablet (25 mg) by mouth daily 90 tablet 3    losartan (COZAAR) 25 MG tablet Take 1 tablet (25 mg) by mouth daily (Patient taking differently: Take 75 mg by mouth daily.) 90 tablet 3    metoprolol succinate ER (TOPROL XL) 50 MG 24 hr tablet Take 1 tablet (50 mg) by mouth daily 90 tablet 3    predniSONE (DELTASONE) 20 MG tablet Take 3 tabs by mouth daily x 3 days, then 2 tabs daily x 3 days, then 1 tab daily x 3 days, then 1/2 tab daily x 3 days. 20 tablet 0    tirzepatide (MOUNJARO) 5 MG/0.5ML SOAJ auto-injector pen Inject 0.5 mLs (5 mg) subcutaneously once a week. 6 mL 0    tirzepatide (MOUNJARO) 5 MG/0.5ML SOAJ auto-injector pen Inject 0.5 mLs (5 mg) subcutaneously every 7 days. 6 mL 0    Tirzepatide (MOUNJARO) 7.5 MG/0.5ML SOAJ auto-injector pen Inject 0.5 mLs (7.5 mg) subcutaneously once a week. 2 mL 1    TURMERIC PO Take 2 tablets by mouth daily.      Vitamin D3 (CHOLECALCIFEROL) 25 mcg (1000 units) tablet Take 1,000 Units by mouth      amoxicillin (AMOXIL) 500 MG tablet TAKE 4 TABLETS 1 HOUR BEFORE DENTAL APPOINTMENT. (Patient not taking: Reported on 6/4/2025)      Ferrous Sulfate (IRON) 28 MG TABS  (Patient not taking: Reported on 6/4/2025)      Multiple Vitamins-Minerals (MULTIVITAL PO) Take 1 tablet by mouth daily  (Patient not taking: Reported on 6/4/2025)      predniSONE (DELTASONE) 20 MG tablet Take 3 tabs by mouth daily x 3 days, then 2 tabs daily x 3 days, then 1 tab daily x 3 days, then 1/2 tab daily x 3 days. (Patient not taking: Reported on 6/4/2025) 20 tablet 0    predniSONE (DELTASONE) 20 MG tablet 2 tabs for 5 days, then 1 tab days 6-10 (Patient not taking: Reported on 6/4/2025) 15 tablet 0     No current facility-administered medications for this visit.       No current facility-administered medications for this visit.        Family History   No family history on  "file.      Physical Exam     COMPREHENSIVE EXAMINATION:  Vitals:    06/04/25 1459   BP: 120/56   BP Location: Right arm   Patient Position: Sitting   Cuff Size: Adult Large   Pulse: 77   SpO2: 97%   Weight: 103.5 kg (228 lb 3.2 oz)   Height: 1.803 m (5' 11\")     Pleasant gentleman, alert and oriented x 3.  Not in acute pain today (note: On 60 mg prednisone with taper per PCP)    Head/neck: No butterfly rash, no jaundice, no conjunctival pallor, no ocular redness, no facial asymmetry, no enlargement of the major salivary glands, no oral ulcers    Lungs: clear to auscultation, no crackles or wheezing    Heart sounds :regular    Abdomen: Soft, nontender    Musculoskeletal: (Note: Patient on high-dose steroids, 60 mg prednisone) which can mask joint swelling and inflammation    Right hand: No synovitis or tenderness of 1-5 MCP joints, no synovitis or tenderness of 1-5 PIP joints.  No swan-neck or boutonniere deformities    Left hand: No synovitis or tenderness of 1-5 MCP joints, no synovitis or tenderness of 1-5 PIP joints, no swan-neck or boutonniere deformities    Right wrist: no synovitis,no tenderness    Left wrist: No tenderness, no synovitis    Elbows: Full range of motion, no swelling or synovitis    Right shoulder: Normal abduction, internal and external rotation    Left shoulder: Normal abduction, internal and external rotation    Cervical spine: Normal flexion, lateral rotation bilaterally full    Spine: No alignment abnormalities noted    Right hip:Full  Flexion internal and external rotation    Left hip: Full flexion, internal and external rotation    Right knee: no effusion, no redness, full ROM    Left knee: No effusion, no redness, full ROM    Rt ankle: No swelling, redness or tenderness    Left ankle: No swelling, redness or tenderness    Right foot: No swelling, redness or tenderness of the toes/MTPs    Left foot: No swelling, redness or tenderness of the toes/MTP joints          Labs / Imaging (select " studies)     Uric Acid   Date Value Ref Range Status   04/11/2025 8.4 (H) 3.4 - 7.0 mg/dL Final      Hemoglobin   Date Value Ref Range Status   02/04/2025 13.2 (L) 13.3 - 17.7 g/dL Final   09/17/2024 12.2 (L) 13.3 - 17.7 g/dL Final   06/20/2024 11.1 (L) 13.3 - 17.7 g/dL Final   02/04/2020 14.5 13.3 - 17.7 g/dL Final     Urea Nitrogen   Date Value Ref Range Status   05/27/2025 33.3 (H) 8.0 - 23.0 mg/dL Final   04/11/2025 66.0 (H) 8.0 - 23.0 mg/dL Final   02/26/2025 44.5 (H) 8.0 - 23.0 mg/dL Final   02/04/2020 24 7 - 30 mg/dL Final     AST   Date Value Ref Range Status   04/11/2025 19 0 - 45 U/L Final   02/04/2020 15 0 - 45 U/L Final     Albumin   Date Value Ref Range Status   05/27/2025 3.8 3.5 - 5.2 g/dL Final   04/11/2025 4.2 3.5 - 5.2 g/dL Final   02/04/2025 4.3 3.5 - 5.2 g/dL Final   02/04/2020 3.3 (L) 3.4 - 5.0 g/dL Final     Alkaline Phosphatase   Date Value Ref Range Status   04/11/2025 96 40 - 150 U/L Final   02/04/2020 62 40 - 150 U/L Final     ALT   Date Value Ref Range Status   04/11/2025 19 0 - 70 U/L Final   02/04/2020 33 0 - 70 U/L Final          Immunization History     Immunization History   Administered Date(s) Administered    Influenza Vaccine (Flucelvax Quadrivalent) 09/24/2021    Influenza Vaccine >6 months,quad, PF 10/21/2022, 10/17/2023    Influenza Vaccine Trivalent (FluBlok) 10/08/2024    TDAP (Adacel,Boostrix) 09/20/2012    Zoster recombinant adjuvanted (Shingrix) 10/17/2023

## 2025-06-04 NOTE — PROGRESS NOTES
Frank is a 64 year old who is being evaluated via a billable video visit.    How would you like to obtain your AVS? MyChart  If the video visit is dropped, the invitation should be resent by: Text to cell phone: [phone]  Will anyone else be joining your video visit? No      {PROVIDER CHARTING PREFERENCE:171927}    Subjective   Frank is a 64 year old, presenting for the following health issues:  Pain and Diabetes        6/4/2025    10:02 AM   Additional Questions   Roomed by Kecia SOLIS MA   Accompanied by Self     HPI      Just started prednisone 2 days ago, on 3rd day of taking 60mg.  Forgot to check before breakfast today and checked 1 minute after eating, sugars were at 249 and then  2 hrs later was at 311      Mounjaro was increased recently to 7.5 mg.  [facility] weekend felt horrible.  Happened again on Sunday/Monday of this week. After taking the mounjaro, felt horrible again.  Extreme heartburn and then diarrhea.  Nighttime heartburn, not bad during the day.    If the dose of Mounjaro needs to be decreased back down to 5 mg, would need a new prescription sent to the pharmacy so it can be ordered in time.    Pain in the wrist came back 2 weeks after the cortisone shot.  Is now having pain in his ankle.      Diabetes Follow-up    How often are you checking your blood sugar? A few times a month, but since being on prednisone has been checking more frequently and with being on mounjaro is checking every other day.  140's in the afternoon usually.  High sugar readings and aggravating his neuropathy.  What time of day are you checking your blood sugars (select all that apply)?  Before and after meals  Have you had any blood sugars above 200?  Yes just recently  Have you had any blood sugars below 70?  No  What symptoms do you notice when your blood sugar is low?  None  What concerns do you have today about your diabetes? None and Blood sugar is often over 200   Do you have any of these symptoms? (Select all that  "apply)  Numbness in feet and Burning in feet      BP Readings from Last 2 Encounters:   04/11/25 134/82   03/11/25 (!) 167/72     Hemoglobin A1C (%)   Date Value   05/27/2025 8.1 (H)   02/26/2025 8.2 (H)     LDL Cholesterol Calculated (mg/dL)   Date Value   06/20/2024 93       {Reference  Diabetes Management Resources  Blood Glucose Log - 3 weeks  Blood Glucose Log with Food and Insulin Record :383433}    {additonal problems for provider to add (Optional):078900}    {ROS Picklists (Optional):168316}      Objective           Vitals:  No vitals were obtained today due to virtual visit.    Physical Exam   GENERAL: alert and no distress  EYES: Eyes grossly normal to inspection.  No discharge or erythema, or obvious scleral/conjunctival abnormalities.  RESP: No audible wheeze, cough, or visible cyanosis.    SKIN: Visible skin clear. No significant rash, abnormal pigmentation or lesions.  NEURO: Cranial nerves grossly intact.  Mentation and speech appropriate for age.  PSYCH: Appropriate affect, tone, and pace of words        Video-Visit Details    Type of service:  Video Visit   Originating Location (pt. Location): {video visit patient location:825820::\"Home\"}  Distant Location (provider location):  {virtual location provider:601568}  Platform used for Video Visit: {Virtual Visit Platforms:415200::\"Guidecentral\"}  Signed Electronically by: Sofia Colvni, DO    " Home  Distant Location (provider location):  On-site  Platform used for Video Visit: Doreen  Signed Electronically by: Sofia Colvin DO

## 2025-06-05 ENCOUNTER — VIRTUAL VISIT (OUTPATIENT)
Dept: EDUCATION SERVICES | Facility: CLINIC | Age: 65
End: 2025-06-05
Payer: COMMERCIAL

## 2025-06-05 ENCOUNTER — TELEPHONE (OUTPATIENT)
Dept: EDUCATION SERVICES | Facility: CLINIC | Age: 65
End: 2025-06-05

## 2025-06-05 ENCOUNTER — RESULTS FOLLOW-UP (OUTPATIENT)
Dept: FAMILY MEDICINE | Facility: CLINIC | Age: 65
End: 2025-06-05

## 2025-06-05 DIAGNOSIS — E11.9 TYPE 2 DIABETES MELLITUS WITHOUT COMPLICATION, WITHOUT LONG-TERM CURRENT USE OF INSULIN (H): Primary | ICD-10-CM

## 2025-06-05 LAB
ALT SERPL W P-5'-P-CCNC: 16 U/L (ref 0–70)
CREAT SERPL-MCNC: 1.83 MG/DL (ref 0.67–1.17)
CRP SERPL-MCNC: 57.8 MG/L
EGFRCR SERPLBLD CKD-EPI 2021: 41 ML/MIN/1.73M2
RHEUMATOID FACT SERPL-ACNC: <10 IU/ML
URATE SERPL-MCNC: 7.5 MG/DL (ref 3.4–7)

## 2025-06-05 RX ORDER — HUMAN INSULIN 100 [IU]/ML
INJECTION, SUSPENSION SUBCUTANEOUS
Qty: 15 ML | Refills: 1 | Status: SHIPPED | OUTPATIENT
Start: 2025-06-05

## 2025-06-05 RX ORDER — PEN NEEDLE, DIABETIC 30 GX3/16"
1 NEEDLE, DISPOSABLE MISCELLANEOUS DAILY
Qty: 100 EACH | Refills: 1 | Status: SHIPPED | OUTPATIENT
Start: 2025-06-05

## 2025-06-05 NOTE — LETTER
6/5/2025         RE: Frank Mace  [address]  [place] [address]        Dear Colleague,    Thank you for referring your patient, Frank Mace, to the [facility]. Please see a copy of my visit note below.    Diabetes Self-Management Education & Support    Presents for: Follow-up    Type of service:  Video Visit    If the video visit is dropped, the video visit invitation should be resent by: Text to cell phone: [phone]    Originating Location (pt. Location): Other in car  Distant Location (provider location): [facility]  Mode of Communication:  Video Conference via [employer]    Video Start Time: 11:11am  Video End Time (time video stopped): 11:40am    How would patient like to obtain AVS? MyChart      Assessment  -type 2 diabetes for ~13-14 years  -recent a1c of 8.2 (previous 5.9, in 2016 11.0)  -currently being managed with Jardiance, Mounjaro (Metformin caused upset stomach)  -on statin therapy  -diabetes complications:  CKD  -last seen by diabetes education 5/7/25  -home BG monitoring reveals:  fasting today 235, 2hr later 250; last night at supper 441, 464, 9:30pm 300, ?  -on oral prednisone, states on 40 mg now, will be tapering down  -recent egfr of 41    Primary concern: elevated BG reading and side effects of Mounjaro.      Discussed:  onset/peak/duration of NPH, use of insulin pen, target BG, hypoglycemia symptoms and treatment,  care team, resources.  Patient expressed understanding.    Offered to set up another visit, he will reach out as needed.        Patient's most recent   Lab Results   Component Value Date    A1C 8.1 05/27/2025     is not meeting goal of <7.0    Diabetes knowledge and skills assessment:   Patient is knowledgeable in diabetes management concepts related to: Monitoring and Taking Medication    Based on learning assessment above, most appropriate setting for further diabetes education would be:  Individual setting.    Care Plan and Education Provided:  Taking Medication: Insulin Instruction - Insulin administration technique taught today. Patient verbalized understanding and was able to perform an accurate return demonstration of administration technique.    Patient verbalized understanding of diabetes self-management education concepts discussed, opportunities for ongoing education and support, and recommendations provided today.    Plan    1. Start NPH 9 units in the morning at the same time you take your prednisone.  In 3 days if your daytime reading are still > 180 mg/d, increase to 11 units.  2.  As long as you are taking 10 mg of the prednisone you can continue on the same dose of the insulin.    3.  Continue Jardiance as prescribed.  4.  Reduce the Mounjaro to 5.0 mg as prescribed.    5.  Continue to check your BG 3 times/day: fasting, before supper and 2 hrs after the start .  6.  If you have 2 or more readings less than 70 mg/dL in one week reach out to your Primary Care Provider or Diabetes Education.  7.  Send in your BG readings  on Tuesday Cherelle 10.  If you have questions you can send them through [employer] or call Diabetes Education Triage [phone].  For Scheduling call [phone].  8.  Follow up with Dr. Colvin as directed.      Topics to cover at upcoming visits: Healthy Eating, Being Active, Monitoring, Taking Medication, Problem Solving, Reducing Risks, and Healthy Coping    See Care Plan for co-developed, patient-state behavior change goals.    Education Materials Provided:  No new materials provided today      Subjective/Objective  Frank is an 64 year old, presenting for the following diabetes education related to: Follow-up  Accompanied by: Self  Diabetes education in the past 24mo: Yes  Focus of Visit: Taking Medication  Diabetes type: Type 2  Date of diagnosis: 13-14 years ago  How confident are you filling out medical forms by yourself:: Extremely  Cultural Influences/Ethnic  "Background:  Not  or     Diabetes Symptoms & Complications:  Diabetes Related Symptoms: Fatigue, Neuropathy, Polyuria (increased urination)  Weight trend: Decreasing       Patient Problem List and Family Medical History reviewed for relevant medical history, current medical status, and diabetes risk factors.    Vitals:  There were no vitals taken for this visit.  Estimated body mass index is 31.83 kg/m  as calculated from the following:    Height as of 6/4/25: 1.803 m (5' 11\").    Weight as of 6/4/25: 103.5 kg (228 lb 3.2 oz).   Last 3 BP:   BP Readings from Last 3 Encounters:   06/04/25 120/56   04/11/25 134/82   03/11/25 (!) 167/72       History   Smoking Status     Never   Smokeless Tobacco     Never       Labs:  Lab Results   Component Value Date    A1C 8.1 05/27/2025     Lab Results   Component Value Date     05/27/2025     02/04/2020     Lab Results   Component Value Date    LDL 93 06/20/2024     Direct Measure HDL   Date Value Ref Range Status   06/20/2024 37 (L) >=40 mg/dL Final     GFR Estimate   Date Value Ref Range Status   06/04/2025 41 (L) >60 mL/min/1.73m2 Final     Comment:     eGFR calculated using 2021 CKD-EPI equation.   02/04/2020 82 >60 mL/min/[1.73_m2] Final     Comment:     Non  GFR Calc  Starting 12/18/2018, serum creatinine based estimated GFR (eGFR) will be   calculated using the Chronic Kidney Disease Epidemiology Collaboration   (CKD-EPI) equation.       GFR Estimate If Black   Date Value Ref Range Status   02/04/2020 >90 >60 mL/min/[1.73_m2] Final     Comment:      GFR Calc  Starting 12/18/2018, serum creatinine based estimated GFR (eGFR) will be   calculated using the Chronic Kidney Disease Epidemiology Collaboration   (CKD-EPI) equation.       Lab Results   Component Value Date    CR 1.83 06/04/2025    CR 1.00 02/04/2020     Lab Results   Component Value Date    MICROL 849.4 10/08/2024    UMALCR 1,187.97 (H) 10/08/2024    UCRR " "114.2 05/27/2025 6/5/2025   Healthy Eating   Healthy Eating Assessed Today Yes   Cultural/Restorationist diet restrictions? No   Meal planning/habits Low salt;Low carb       tries to avoid processed foods   Who cooks/prepares meals for you? Self;Spouse   Who purchases food in  your home? Self;Spouse   How many times a week on average do you eat food made away from home (restaurant/take-out)? 1   Meals include Breakfast;Lunch;Dinner;Afternoon Snack   Breakfast 1 egg omlette w/ swiss cheese, water   Dinner 6:30-7pm: 2 small chicken strips, 1 cup brown rice   Snacks PM: fruit w/ cool whip;   Beverages Water;Diet soda;Coffee drinks;Coffee       2.5 L of water/day         6/5/2025   Being Active   Being Active Assessed Today Yes   Exercise: Currently not exercising   Barrier to exercise Other       knee and shoulder issues         6/5/2025   Monitoring   Monitoring Assessed Today Yes   Did patient bring glucose meter to appointment?  Yes   Blood Glucose Meter Reli-On   Times checking blood sugar at home (number) 2   Times checking blood sugar at home (per) Week   Blood glucose trend --        states fasting when not on Glipizide is in 200's and later in day 130's, but \"now on the prednisone it will be higher\"       Diabetes Medication(s)       Sodium-Glucose Co-Transporter 2 (SGLT2) Inhibitors       empagliflozin (JARDIANCE) 25 MG TABS tablet Take 1 tablet (25 mg) by mouth daily.       Incretin Mimetic Agents       tirzepatide (MOUNJARO) 5 MG/0.5ML SOAJ auto-injector pen Inject 0.5 mLs (5 mg) subcutaneously once a week.     tirzepatide (MOUNJARO) 5 MG/0.5ML SOAJ auto-injector pen Inject 0.5 mLs (5 mg) subcutaneously every 7 days.     Tirzepatide (MOUNJARO) 7.5 MG/0.5ML SOAJ auto-injector pen Inject 0.5 mLs (7.5 mg) subcutaneously once a week.              6/5/2025   Taking Medications   Taking Medication Assessed Today Yes   Current Treatments Non-insulin Injectables;Oral Medication (taken by mouth)       has been " on Mounjaro 3+ months   Problems taking diabetes medications regularly? No   Diabetes medication side effects? No       but wonders if he has some reflux          No data to display                Kira Sotomayor RD, [facility], 11:56 AM, 6/5/2025    Time Spent: 29 minutes  Encounter Type: Individual    Any diabetes medication dose changes were made via the [facility] Standing Orders under the patient's referring provider.

## 2025-06-05 NOTE — PROGRESS NOTES
Diabetes Self-Management Education & Support    Presents for: Follow-up    Type of service:  Video Visit    If the video visit is dropped, the video visit invitation should be resent by: Text to cell phone: [phone]    Originating Location (pt. Location): Other in car  Distant Location (provider location): [facility]  Mode of Communication:  Video Conference via [employer]    Video Start Time: 11:11am  Video End Time (time video stopped): 11:40am    How would patient like to obtain AVS? MyChart      Assessment  -type 2 diabetes for ~13-14 years  -recent a1c of 8.2 (previous 5.9, in 2016 11.0)  -currently being managed with Jardiance, Mounjaro (Metformin caused upset stomach)  -on statin therapy  -diabetes complications:  CKD  -last seen by diabetes education 5/7/25  -home BG monitoring reveals:  fasting today 235, 2hr later 250; last night at supper 441, 464, 9:30pm 300, ?  -on oral prednisone, states on 40 mg now, will be tapering down  -recent egfr of 41    Primary concern: elevated BG reading and side effects of Mounjaro.      Discussed:  onset/peak/duration of NPH, use of insulin pen, target BG, hypoglycemia symptoms and treatment,  care team, resources.  Patient expressed understanding.    Offered to set up another visit, he will reach out as needed.        Patient's most recent   Lab Results   Component Value Date    A1C 8.1 05/27/2025     is not meeting goal of <7.0    Diabetes knowledge and skills assessment:   Patient is knowledgeable in diabetes management concepts related to: Monitoring and Taking Medication    Based on learning assessment above, most appropriate setting for further diabetes education would be: Individual setting.    Care Plan and Education Provided:  Taking Medication: Insulin Instruction - Insulin administration technique taught today. Patient verbalized understanding and was able to perform an accurate return demonstration of administration  technique.    Patient verbalized understanding of diabetes self-management education concepts discussed, opportunities for ongoing education and support, and recommendations provided today.    Plan    1. Start NPH 9 units in the morning at the same time you take your prednisone.  In 3 days if your daytime reading are still > 180 mg/d, increase to 11 units.  2.  As long as you are taking 10 mg of the prednisone you can continue on the same dose of the insulin.    3.  Continue Jardiance as prescribed.  4.  Reduce the Mounjaro to 5.0 mg as prescribed.    5.  Continue to check your BG 3 times/day: fasting, before supper and 2 hrs after the start .  6.  If you have 2 or more readings less than 70 mg/dL in one week reach out to your Primary Care Provider or Diabetes Education.  7.  Send in your BG readings  on Tuesday Cherelle 10.  If you have questions you can send them through [employer] or call Diabetes Education Triage [phone].  For Scheduling call [phone].  8.  Follow up with Dr. Colvin as directed.      Topics to cover at upcoming visits: Healthy Eating, Being Active, Monitoring, Taking Medication, Problem Solving, Reducing Risks, and Healthy Coping    See Care Plan for co-developed, patient-state behavior change goals.    Education Materials Provided:  No new materials provided today      Subjective/Objective  Frank is an 64 year old, presenting for the following diabetes education related to: Follow-up  Accompanied by: Self  Diabetes education in the past 24mo: Yes  Focus of Visit: Taking Medication  Diabetes type: Type 2  Date of diagnosis: 13-14 years ago  How confident are you filling out medical forms by yourself:: Extremely  Cultural Influences/Ethnic Background:  Not  or     Diabetes Symptoms & Complications:  Diabetes Related Symptoms: Fatigue, Neuropathy, Polyuria (increased urination)  Weight trend: Decreasing       Patient Problem List and Family Medical History reviewed for relevant  "medical history, current medical status, and diabetes risk factors.    Vitals:  There were no vitals taken for this visit.  Estimated body mass index is 31.83 kg/m  as calculated from the following:    Height as of 6/4/25: 1.803 m (5' 11\").    Weight as of 6/4/25: 103.5 kg (228 lb 3.2 oz).   Last 3 BP:   BP Readings from Last 3 Encounters:   06/04/25 120/56   04/11/25 134/82   03/11/25 (!) 167/72       History   Smoking Status    Never   Smokeless Tobacco    Never       Labs:  Lab Results   Component Value Date    A1C 8.1 05/27/2025     Lab Results   Component Value Date     05/27/2025     02/04/2020     Lab Results   Component Value Date    LDL 93 06/20/2024     Direct Measure HDL   Date Value Ref Range Status   06/20/2024 37 (L) >=40 mg/dL Final     GFR Estimate   Date Value Ref Range Status   06/04/2025 41 (L) >60 mL/min/1.73m2 Final     Comment:     eGFR calculated using 2021 CKD-EPI equation.   02/04/2020 82 >60 mL/min/[1.73_m2] Final     Comment:     Non  GFR Calc  Starting 12/18/2018, serum creatinine based estimated GFR (eGFR) will be   calculated using the Chronic Kidney Disease Epidemiology Collaboration   (CKD-EPI) equation.       GFR Estimate If Black   Date Value Ref Range Status   02/04/2020 >90 >60 mL/min/[1.73_m2] Final     Comment:      GFR Calc  Starting 12/18/2018, serum creatinine based estimated GFR (eGFR) will be   calculated using the Chronic Kidney Disease Epidemiology Collaboration   (CKD-EPI) equation.       Lab Results   Component Value Date    CR 1.83 06/04/2025    CR 1.00 02/04/2020     Lab Results   Component Value Date    MICROL 849.4 10/08/2024    UMALCR 1,187.97 (H) 10/08/2024    UCRR 114.2 05/27/2025 6/5/2025   Healthy Eating   Healthy Eating Assessed Today Yes   Cultural/Taoism diet restrictions? No   Meal planning/habits Low salt;Low carb       tries to avoid processed foods   Who cooks/prepares meals for you? Self;Spouse " "  Who purchases food in  your home? Self;Spouse   How many times a week on average do you eat food made away from home (restaurant/take-out)? 1   Meals include Breakfast;Lunch;Dinner;Afternoon Snack   Breakfast 1 egg omlette w/ swiss cheese, water   Dinner 6:30-7pm: 2 small chicken strips, 1 cup brown rice   Snacks PM: fruit w/ cool whip;   Beverages Water;Diet soda;Coffee drinks;Coffee       2.5 L of water/day         6/5/2025   Being Active   Being Active Assessed Today Yes   Exercise: Currently not exercising   Barrier to exercise Other       knee and shoulder issues         6/5/2025   Monitoring   Monitoring Assessed Today Yes   Did patient bring glucose meter to appointment?  Yes   Blood Glucose Meter Reli-On   Times checking blood sugar at home (number) 2   Times checking blood sugar at home (per) Week   Blood glucose trend --        states fasting when not on Glipizide is in 200's and later in day 130's, but \"now on the prednisone it will be higher\"       Diabetes Medication(s)       Sodium-Glucose Co-Transporter 2 (SGLT2) Inhibitors       empagliflozin (JARDIANCE) 25 MG TABS tablet Take 1 tablet (25 mg) by mouth daily.       Incretin Mimetic Agents       tirzepatide (MOUNJARO) 5 MG/0.5ML SOAJ auto-injector pen Inject 0.5 mLs (5 mg) subcutaneously once a week.     tirzepatide (MOUNJARO) 5 MG/0.5ML SOAJ auto-injector pen Inject 0.5 mLs (5 mg) subcutaneously every 7 days.     Tirzepatide (MOUNJARO) 7.5 MG/0.5ML SOAJ auto-injector pen Inject 0.5 mLs (7.5 mg) subcutaneously once a week.              6/5/2025   Taking Medications   Taking Medication Assessed Today Yes   Current Treatments Non-insulin Injectables;Oral Medication (taken by mouth)       has been on Mounjaro 3+ months   Problems taking diabetes medications regularly? No   Diabetes medication side effects? No       but wonders if he has some reflux          No data to display                Kira Sotomayor RD, [facility] [place], 11:56 AM, 6/5/2025    Time Spent: 29 " minutes  Encounter Type: Individual    Any diabetes medication dose changes [facility] via the [facility] under the patient's referring provider.

## 2025-06-05 NOTE — TELEPHONE ENCOUNTER
[facility] Call Center    Phone Message    May a detailed message be left on voicemail: yes     Reason for Call: Other: Pt would like to know if his appt today can be switched to virtual or telephone. Pt states he will not be able to make it to that appt. Pt did not wish to cancel or reschedule the appt and writer was unable to change the mode. Please advise.     Action Taken: Other: Diab ed    Travel Screening: Not Applicable     Date of Service:

## 2025-06-05 NOTE — PATIENT INSTRUCTIONS
Plan    1. Start NPH 9 units in the morning at the same time you take your prednisone.  In 3 days if your daytime reading are still > 180 mg/d, increase to 11 units.  2.  As long as you are taking 10 mg of the prednisone you can continue on the same dose of the insulin.    3.  Continue Jardiance as prescribed.  4.  Reduce the Mounjaro to 5.0 mg as prescribed.    5.  Continue to check your BG 3 times/day: fasting, before supper and 2 hrs after the start .  6.  If you have 2 or more readings less than 70 mg/dL in one week reach out to your Primary Care Provider or Diabetes Education.  7.  Send in your BG readings  on Tuesday Cherelle 10.  If you have questions you can send them through [employer] or call Diabetes Education Triage [phone].  For Scheduling call [phone].  8.  Follow up with Dr. Colvin as directed.    Kira Sotomayor RD, [facility], 12:50 PM, 6/5/2025

## 2025-06-05 NOTE — TELEPHONE ENCOUNTER
Made the change for 11 am to virtual as patient cannot be in person.    Jen Gonzalez RD, LD, [facility]  Certified Diabetes Care &   Outpatient Adult Care - [facility]  [phone] - Triage

## 2025-06-10 ENCOUNTER — TELEPHONE (OUTPATIENT)
Dept: RHEUMATOLOGY | Facility: CLINIC | Age: 65
End: 2025-06-10
Payer: COMMERCIAL

## 2025-06-10 DIAGNOSIS — Z87.39 HISTORY OF GOUT: Primary | ICD-10-CM

## 2025-06-10 DIAGNOSIS — E79.0 HYPERURICEMIA: ICD-10-CM

## 2025-06-10 NOTE — TELEPHONE ENCOUNTER
[facility] Call Center    Phone Message    May a detailed message be left on voicemail: yes     Reason for Call: Order(s): Other:   Reason for requested: labs  Date needed: 07/08/2025  Provider name: Dr. Destinee Morris     Action Taken: Other: rheum      Travel Screening: Not Applicable     Date of Service:

## 2025-06-11 ENCOUNTER — TELEPHONE (OUTPATIENT)
Dept: EDUCATION SERVICES | Facility: CLINIC | Age: 65
End: 2025-06-11
Payer: COMMERCIAL

## 2025-06-11 NOTE — TELEPHONE ENCOUNTER
Diabetes Education Follow-up    Subjective/Objective:    Frank Mace sent in blood glucose log for review. Last date of communication was: 6/5/25.    Diabetes is being managed with Diabetes Medications   Diabetes Medication(s)       Insulin       insulin NPH (NOVOLIN N FLEXPEN) 100 UNIT/ML injection Inject 9 units in the morning at the same time as you take the prednisone.  Max dose 30 units daily.       Sodium-Glucose Co-Transporter 2 (SGLT2) Inhibitors       empagliflozin (JARDIANCE) 25 MG TABS tablet Take 1 tablet (25 mg) by mouth daily.       Incretin Mimetic Agents       tirzepatide (MOUNJARO) 5 MG/0.5ML SOAJ auto-injector pen Inject 0.5 mLs (5 mg) subcutaneously once a week.     tirzepatide (MOUNJARO) 5 MG/0.5ML SOAJ auto-injector pen Inject 0.5 mLs (5 mg) subcutaneously every 7 days.     Tirzepatide (MOUNJARO) 7.5 MG/0.5ML SOAJ auto-injector pen Inject 0.5 mLs (7.5 mg) subcutaneously once a week.            BG/Food Log:   Pt does not have logs with him but going by memory  Date Breakfast  Lunch  Dinner  Bedtime    Before After Before After Before After    6/5      >400 Started NPH 9 units   6/6 200's         6/7 200's      Didn't eat   6/8 200's      Did not increase to 11 units;  Didn't eat   6/9 95  197  236     6/11 136               Assessment:    Fasting blood glucose: ~33% in target.  After breakfast glucose: -% in target.  Before lunch glucose: 0% in target.  After lunch glucose: -% in target.  Before dinner glucose: 0% in target.  After dinner glucose: 0% in target.  Bedtime glucose: ?% in target.    Pt reports prednisone is tapering down.  6/13/25 will be last day of prednisone 10 mg.      Plan/Response:  See Patient Instructions for co-developed, patient-stated behavior change goals.  Check blood sugars 1-3 times/day  Keep a blood glucose record for next visit.  Continue Mounjaro as prescribed.  Continue NPH 9 units, but if you start to have readings less than 70 mg/dL, reach out to your  provider or Diabetes Education.  Over the weekend reduce or stop the NPH.    Kira Sotomayor RD, [facility], 5:33 PM, 6/11/2025      Any diabetes medication dose changes were made via the CDE Protocol and Collaborative Practice Agreement with the patient's referring provider. A copy of this encounter was shared with the provider.

## 2025-06-17 ENCOUNTER — VIRTUAL VISIT (OUTPATIENT)
Dept: EDUCATION SERVICES | Facility: CLINIC | Age: 65
End: 2025-06-17
Payer: COMMERCIAL

## 2025-06-17 DIAGNOSIS — E11.9 TYPE 2 DIABETES MELLITUS WITHOUT COMPLICATION, WITHOUT LONG-TERM CURRENT USE OF INSULIN (H): Primary | ICD-10-CM

## 2025-06-17 PROCEDURE — G0108 DIAB MANAGE TRN  PER INDIV: HCPCS | Mod: 93 | Performed by: DIETITIAN, REGISTERED

## 2025-06-17 NOTE — PATIENT INSTRUCTIONS
Plan/Response:    1.  Continue Mounjaro 5 mg once weekly.  2.  Continue Jardiance 25 mg once daily.  3.  Continue NPH 12 units in the morning.  4.  Continue to test BG readings.  5.  Follow up with Dr. Colvin as scheduled on 6/20/25.  6. Send in BG readings to Diabetes Education in 2 weeks and plan a follow up early August.

## 2025-06-17 NOTE — LETTER
6/17/2025         RE: Frank Mace  [id number] [place] [address]        Dear Colleague,    Thank you for referring your patient, Frank Mace, to the [facility]. Please see a copy of my visit note below.    Diabetes Education Follow-up    Subjective/Objective:    Type of Service: Telephone Visit    Originating Location (Patient Location): Home  Distant Location (Provider Location): [facility]  Mode of Communication:  Telephone    Telephone Visit Start Time: 3:00pm  Telephone Visit End Time (telephone visit stop time): 3:31pm    How would patient like to obtain AVS? MyChart    Frank Mace sent in blood glucose log for review. Last date of communication was: 6/11/25.    Diabetes is being managed with Diabetes Medications   Diabetes Medication(s)       Insulin       insulin NPH (NOVOLIN N FLEXPEN) 100 UNIT/ML injection Inject 9 units in the morning at the same time as you take the prednisone.  Max dose 30 units daily.       Sodium-Glucose Co-Transporter 2 (SGLT2) Inhibitors       empagliflozin (JARDIANCE) 25 MG TABS tablet Take 1 tablet (25 mg) by mouth daily.       Incretin Mimetic Agents       tirzepatide (MOUNJARO) 5 MG/0.5ML SOAJ auto-injector pen Inject 0.5 mLs (5 mg) subcutaneously once a week.     tirzepatide (MOUNJARO) 5 MG/0.5ML SOAJ auto-injector pen Inject 0.5 mLs (5 mg) subcutaneously every 7 days.     Tirzepatide (MOUNJARO) 7.5 MG/0.5ML SOAJ auto-injector pen Inject 0.5 mLs (7.5 mg) subcutaneously once a week.        Taking NPH 12 units.  Stopped prednisone- stated last dose on 6/14/25.    BG/Food Log:   Date Breakfast  Lunch  Dinner  Bedtime    Before After Before After Before After    6/17 142         6/16 138  177  161  210   6/15 127  133 161 179     6/14 143  141  161  142   6/13 137  175  230     6/12 117  226  202     6/11 136    202           Assessment:    Fasting blood glucose: 29% in target.  After breakfast  glucose: -% in target.  Before lunch glucose: 0% in target.  After lunch glucose: 100% in target.  Before dinner glucose: 0% in target.  After dinner glucose: -% in target.  Bedtime glucose: 50% in target.    Had increased the Mounjaro to 7.5 several weeks ago but when back to the 5 mg due to side effects.  Reports he is feeling better.  Side effects from Mounjaro appear to be improving as he reports feeling sick to stomach, loose stools and sleepy only a couple days after injecting.  But has also started Cholchicine so unsure what side effects are from that or the Mounjaro.  Wants to continue with current regimen.  Discussed that the NPH will likely be able to be stopped.      Reviewed: onset/peak/duration of NPH, hypoglycemia symptoms and treatment, how long NPH pen is good for, target BG and A1c, send in BG readings in 2 weeks, schedule follow up with Diabetes Ed. Pt expressed understanding and agreed with the plan.    Plan/Response:    1.  Continue Mounjaro 5 mg once weekly.  2.  Continue Jardiance 25 mg once daily.  3.  Continue NPH 12 units in the morning.  4.  Continue to test BG readings.  5.  Follow up with Dr. Colvin as scheduled on 6/20/25.  6. Send in BG readings to Diabetes Education in 2 weeks and plan a follow up early August.      Kira Sotomayor RD, [facility], 3:42 PM, 6/17/2025      Any diabetes medication dose changes were made via the CDE Protocol and Collaborative Practice Agreement with the patient's referring provider and primary care provider. A copy of this encounter was shared with the provider.

## 2025-06-17 NOTE — PROGRESS NOTES
Diabetes Education Follow-up    Subjective/Objective:    Type of Service: Telephone Visit    Originating Location (Patient Location): Home  Distant Location (Provider Location): [facility]  Mode of Communication:  Telephone    Telephone Visit Start Time: 3:00pm  Telephone Visit End Time (telephone visit stop time): 3:31pm    How would patient like to obtain AVS? Shelli Mace sent in blood glucose log for review. Last date of communication was: 6/11/25.    Diabetes is being managed with Diabetes Medications   Diabetes Medication(s)       Insulin       insulin NPH (NOVOLIN N FLEXPEN) 100 UNIT/ML injection Inject 9 units in the morning at the same time as you take the prednisone.  Max dose 30 units daily.       Sodium-Glucose Co-Transporter 2 (SGLT2) Inhibitors       empagliflozin (JARDIANCE) 25 MG TABS tablet Take 1 tablet (25 mg) by mouth daily.       Incretin Mimetic Agents       tirzepatide (MOUNJARO) 5 MG/0.5ML SOAJ auto-injector pen Inject 0.5 mLs (5 mg) subcutaneously once a week.     tirzepatide (MOUNJARO) 5 MG/0.5ML SOAJ auto-injector pen Inject 0.5 mLs (5 mg) subcutaneously every 7 days.     Tirzepatide (MOUNJARO) 7.5 MG/0.5ML SOAJ auto-injector pen Inject 0.5 mLs (7.5 mg) subcutaneously once a week.        Taking NPH 12 units.  Stopped prednisone- stated last dose on 6/14/25.    BG/Food Log:   Date Breakfast  Lunch  Dinner  Bedtime    Before After Before After Before After    6/17 142         6/16 138  177  161  210   6/15 127  133 161 179     6/14 143  141  161  142   6/13 137  175  230     6/12 117  226  202     6/11 136    202           Assessment:    Fasting blood glucose: 29% in target.  After breakfast glucose: -% in target.  Before lunch glucose: 0% in target.  After lunch glucose: 100% in target.  Before dinner glucose: 0% in target.  After dinner glucose: -% in target.  Bedtime glucose: 50% in target.    Had increased the Mounjaro to 7.5 several weeks ago  but when back to the 5 mg due to side effects.  Reports he is feeling better.  Side effects from Mounjaro appear to be improving as he reports feeling sick to stomach, loose stools and sleepy only a couple days after injecting.  But has also started Cholchicine so unsure what side effects are from that or the Mounjaro.  Wants to continue with current regimen.  Discussed that the NPH will likely be able to be stopped.      Reviewed: onset/peak/duration of NPH, hypoglycemia symptoms and treatment, how long NPH pen is good for, target BG and A1c, send in BG readings in 2 weeks, schedule follow up with Diabetes Ed. Pt expressed understanding and agreed with the plan.    Plan/Response:    1.  Continue Mounjaro 5 mg once weekly.  2.  Continue Jardiance 25 mg once daily.  3.  Continue NPH 12 units in the morning.  4.  Continue to test BG readings.  5.  Follow up with Dr. Colvin as scheduled on 6/20/25.  6. Send in BG readings to Diabetes Education in 2 weeks and plan a follow up early August.      Kira Sotomayor RD, [facility], 3:42 PM, 6/17/2025      Any diabetes medication dose changes were made via the CDE Protocol and Collaborative Practice Agreement with the patient's referring provider and primary care provider. A copy of this encounter was shared with the provider.

## 2025-06-18 ENCOUNTER — OFFICE VISIT (OUTPATIENT)
Dept: SLEEP MEDICINE | Facility: CLINIC | Age: 65
End: 2025-06-18
Attending: FAMILY MEDICINE
Payer: COMMERCIAL

## 2025-06-18 VITALS
OXYGEN SATURATION: 98 % | HEIGHT: 71 IN | SYSTOLIC BLOOD PRESSURE: 132 MMHG | HEART RATE: 79 BPM | RESPIRATION RATE: 15 BRPM | WEIGHT: 223 LBS | DIASTOLIC BLOOD PRESSURE: 77 MMHG | BODY MASS INDEX: 31.22 KG/M2

## 2025-06-18 DIAGNOSIS — G47.33 OBSTRUCTIVE SLEEP APNEA: Primary | ICD-10-CM

## 2025-06-18 DIAGNOSIS — R53.83 OTHER FATIGUE: ICD-10-CM

## 2025-06-18 PROCEDURE — 99204 OFFICE O/P NEW MOD 45 MIN: CPT | Performed by: STUDENT IN AN ORGANIZED HEALTH CARE EDUCATION/TRAINING PROGRAM

## 2025-06-18 ASSESSMENT — SLEEP AND FATIGUE QUESTIONNAIRES
HOW LIKELY ARE YOU TO NOD OFF OR FALL ASLEEP IN A CAR, WHILE STOPPED FOR A FEW MINUTES IN TRAFFIC: WOULD NEVER DOZE
HOW LIKELY ARE YOU TO NOD OFF OR FALL ASLEEP WHILE SITTING AND TALKING TO SOMEONE: WOULD NEVER DOZE
HOW LIKELY ARE YOU TO NOD OFF OR FALL ASLEEP WHILE LYING DOWN TO REST IN THE AFTERNOON WHEN CIRCUMSTANCES PERMIT: HIGH CHANCE OF DOZING
HOW LIKELY ARE YOU TO NOD OFF OR FALL ASLEEP WHILE WATCHING TV: HIGH CHANCE OF DOZING
HOW LIKELY ARE YOU TO NOD OFF OR FALL ASLEEP WHILE SITTING INACTIVE IN A PUBLIC PLACE: SLIGHT CHANCE OF DOZING
HOW LIKELY ARE YOU TO NOD OFF OR FALL ASLEEP WHEN YOU ARE A PASSENGER IN A CAR FOR AN HOUR WITHOUT A BREAK: SLIGHT CHANCE OF DOZING
HOW LIKELY ARE YOU TO NOD OFF OR FALL ASLEEP WHILE SITTING QUIETLY AFTER LUNCH WITHOUT ALCOHOL: HIGH CHANCE OF DOZING
HOW LIKELY ARE YOU TO NOD OFF OR FALL ASLEEP WHILE SITTING AND READING: SLIGHT CHANCE OF DOZING

## 2025-06-18 NOTE — PATIENT INSTRUCTIONS
"MY INFORMATION ON SLEEP APNEA-  Frank Mace    DOCTOR : Aiden Salazar MD  SLEEP CENTER :      MY CONTACT NUMBER:    [facility]   [phone]  [facility]      Am I having a home sleep study?  Watch the video for the device you are using:  /drop off device, Noxturnal T-3: [email or web address]        Frequently asked questions:  1. What is Obstructive Sleep Apnea (EVELIN)? EVELIN is the most common type of sleep apnea. Apnea means, \"without breath.\"  Apnea is most often caused by narrowing or collapse of the upper airway as muscles relax during sleep.   Almost everyone has occasional apneas. Most people with sleep apnea have had brief interruptions at night frequently for many years.  The severity of sleep apnea is related to how frequent and severe the events are.   Apneas are any events where there is no breathing.  Hypopneas are any events where there is shallow breathing. Sleep studies will track and then add all of the apneas and hypopneas into a total and then divided this number by the total time asleep to obtain the apnea hypopnea index(AHI). 0-5 events/per hour = No Sleep Apnea; 5-15 events/per hour = Mild Sleep Apnea; 15-30 events/per hour = Moderate Sleep Apnea; Greater than 30 events/per hour = Severe Sleep Apnea.  Sleep apnea is typically worse during REM sleep due to complete muscle relaxation, including the muscles of the airway. Sleep apnea is also typically worse during supine(sleeping on your back) sleep due to internal structures more easily falling on the top of the airway and external pressures more easily crushing the airway.  The respiratory disturbance index(RDI) includes apneas, hypopneas, and other respiratory events such as snoring that may disturb your sleep.  2. What are the consequences of EVELIN? Symptoms include: feeling sleepy during the day, snoring loudly, gasping or stopping of breathing, trouble " sleeping, and occasionally morning headaches or heartburn at night.  Sleepiness can be serious and even increase the risk of falling asleep while driving. Other health consequences may include development of high blood pressure and other cardiovascular disease in persons who are susceptible. Untreated EVELIN  can contribute to heart disease, stroke and diabetes.   3. What are the treatment options? In most situations, sleep apnea is a lifelong disease that must be managed with daily therapy. Medications are not effective for sleep apnea and surgery is generally not considered until other therapies have been tried. Your treatment is your choice . Continuous Positive Airway (CPAP) works right away and is the therapy that is effective in nearly everyone. An oral device to hold your jaw forward is usually the next most reliable option. Other options include postioning devices (to keep you off your back), weight loss, and surgery including a tongue pacing device. There is more detail about some of these options below.    Important tips for using CPAP and similar devices   Know your equipment:  CPAP is continuous positive airway pressure that prevents obstructive sleep apnea by keeping the throat from collapsing while you are sleeping. In most cases, the device is  smart  and can slowly self-adjusts if your throat collapses and keeps a record every day of how well you are treated-this information is available to you and your care team.  BPAP is bilevel positive airway pressure that keeps your throat open and also assists each breath with a pressure boost to maintain adequate breathing.  Special kinds of BPAP are used in patients who have inadequate breathing from lung or heart disease. In most cases, the device is  smart  and can slowly self-adjusts to assist breathing. Like CPAP, the device keeps a record of how well you are treated.  Your mask is your connection to the device. You get to choose what feels most comfortable  and the staff will help to make sure if fits. Here: are some examples of the different masks that are available:       Key points to remember on your journey with sleep apnea:  Sleep study.  PAP devices often need to be adjusted during a sleep study to show that they are effective and adjusted right.  Good tips to remember: Try wearing just the mask during a quiet time during the day so your body adapts to wearing it. A humidifier is recommended for comfort in most cases to prevent drying of your nose and throat. Allergy medication from your provider may help you if you are having nasal congestion.  Getting settled-in. It takes more than one night for most of us to get used to wearing a mask. Try wearing just the mask during a quiet time during the day so your body adapts to wearing it. A humidifier is recommended for comfort in most cases. Our team will work with you carefully on the first day and will be in contact within 4 days and again at 2 and 4 weeks for advice and remote device adjustments. Your therapy is evaluated by the device each day.   Use it every night. The more you are able to sleep naturally for 7-8 hours, the more likely you will have good sleep and to prevent health risks or symptoms from sleep apnea. Even if you use it 4 hours it helps. Occasionally all of us are unable to use a medical therapy, in sleep apnea, it is not dangerous to miss one night.   Communicate. Call our skilled team on the number provided on the first day if your visit for problems that make it difficult to wear the device. Over 2 out of 3 patients can learn to wear the device long-term with help from our team. Remember to call our team or your sleep providers if you are unable to wear the device as we may have other solutions for those who cannot adapt to mask CPAP therapy. It is recommended that you sleep your sleep provider within the first 3 months and yearly after that if you are not having problems.   Take care of your  equipment. Make sure you clean your mask and tubing using directions every day and that your filter and mask are replaced as recommended or if they are not working.     BESIDES CPAP, WHAT OTHER THERAPIES ARE THERE?    Positioning Device  Positioning devices are generally used when sleep apnea is mild and only occurs on your back.This example shows a pillow that straps around the waist. It may be appropriate for those whose sleep study shows milder sleep apnea that occurs primarily when lying flat on one's back. Preliminary studies have shown benefit but effectiveness at home may need to be verified by a home sleep test. These devices are generally not covered by medical insurance.    Oral Appliance  What is oral appliance therapy?  An oral appliance device fits on your teeth at night like a retainer used after having braces. The device is made by a specialized dentist and requires several visits over 1-2 months before a manufactured device is made to fit your teeth and is adjusted to prevent your sleep apnea. Once an oral device is working properly, snoring should be improved. A home sleep test may be recommended at that time if to determine whether the sleep apnea is adequately treated.       Some things to remember:  -Oral devices are often, but not always, covered by your medical insurance. Be sure to check with your insurance provider.   -If you are referred for oral therapy, you will be given a list of specialized dentists to consider or you may choose to visit the Web site of the American Academy of Dental Sleep Medicine  -Oral devices are less likely to work if you have severe sleep apnea or are extremely overweight.     More detailed information  An oral appliance is a small acrylic device that fits over the upper and lower teeth  (similar to a retainer or a mouth guard). This device slightly moves jaw forward, which moves the base of the tongue forward, opens the airway, improves breathing for effective  treat snoring and obstructive sleep apnea in perhaps 7 out of 10 people .  The best working devices are custom-made by a dental device  after a mold is made of the teeth 1, 2, 3.  When is an oral appliance indicated?  Oral appliance therapy is recommended as a first-line treatment for patients with primary snoring, mild sleep apnea, and for patients with moderate sleep apnea who prefer appliance therapy to use of CPAP4, 5. Severity of sleep apnea is determined by sleep testing and is based on the number of respiratory events per hour of sleep.   How successful is oral appliance therapy?  The success rate of oral appliance therapy in patients with mild sleep apnea is 75-80% while in patients with moderate sleep apnea it is 50-70%. The chance of success in patients with severe sleep apnea is 40-50%. The research also shows that oral appliances have a beneficial effect on the cardiovascular health of EVELIN patients at the same magnitude as CPAP therapy7.  Oral appliances should be a second-line treatment in cases of severe sleep apnea, but if not completely successful then a combination therapy utilizing CPAP plus oral appliance therapy may be effective. Oral appliances tend to be effective in a broad range of patients although studies show that the patients who have the highest success are females, younger patients, those with milder disease, and less severe obesity. 3, 6.   Finding a dentist that practices dental sleep medicine  Specific training is available through the American Academy of Dental Sleep Medicine for dentists interested in working in the field of sleep. To find a dentist who is educated in the field of sleep and the use of oral appliances, near you, visit the Web site of the American Academy of Dental Sleep Medicine.    References  1. Jaquan et al. Objectively measured vs self-reported compliance during oral appliance therapy for sleep-disordered breathing. Chest 2013; 144(5):  8889-4828.  2. Madelin et al. Objective measurement of compliance during oral appliance therapy for sleep-disordered breathing. Thorax 2013; 68(1): 91-96.  3. Nargis et al. Mandibular advancement devices in 620 men and women with EVELIN and snoring: tolerability and predictors of treatment success. Chest 2004; 125: 2989-8164.  4. Jacob, et al. Oral appliances for snoring and EVELIN: a review. Sleep 2006; 29: 244-262.  5. Ravindra et al. Oral appliance treatment for EVELIN: an update. J Clin Sleep Med 2014; 10(2): 215-227.  6. Victoriano et al. Predictors of OSAH treatment outcome. J Dent Res 2007; 86: 6174-1462.      Weight Loss:    Weight loss is a long-term strategy that may improve sleep apnea in some patients.    Weight management is a personal decision.  If you are interested in exploring weight loss strategies, the following discussion covers the impact on weight loss on sleep apnea and the approaches that may be successful.    Weight loss decreases severity of sleep apnea in most people with obesity. For those with mild obesity who have developed snoring with weight gain, even 15-30 pound weight loss can improve and occasionally eliminate sleep apnea.  Structured and life-long dietary and health habits are necessary to lose weight and keep healthier weight levels.     Though there may be significant health benefits from weight loss, long-term weight loss is very difficult to achieve- studies show success with dietary management in less than 10% of people. In addition, substantial weight loss may require years of dietary control and may be difficult if patients have severe obesity. In these cases, surgical management may be considered.  Finally, older individuals who have tolerated obesity without health complications may be less likely to benefit from weight loss strategies.    Your BMI is Body mass index is 31.12 kg/m .    Weight management plan: Discussed healthy diet and exercise  guidelines    Surgery:    Surgery for obstructive sleep apnea is considered generally only when other therapies fail to work. Surgery may be discussed with you if you are having a difficult time tolerating CPAP and or when there is an abnormal structure that requires surgical correction.  Nose and throat surgeries often enlarge the airway to prevent collapse.  Most of these surgeries create pain for 1-2 weeks and up to half of the most common surgeries are not effective throughout life.  You should carefully discuss the benefits and drawbacks to surgery with your sleep provider and surgeon to determine if it is the best solution for you.   More information  Surgery for EVELIN is directed at areas that are responsible for narrowing or complete obstruction of the airway during sleep.  There are a wide range of procedures available to enlarge and/or stabilize the airway to prevent blockage of breathing in the three major areas where it can occur: the palate, tongue, and nasal regions.  Successful surgical treatment depends on the accurate identification of the factors responsible for obstructive sleep apnea in each person.  A personalized approach is required because there is no single treatment that works well for everyone.  Because of anatomic variation, consultation with an examination by a sleep surgeon is a critical first step in determining what surgical options are best for each patient.  In some cases, examination during sedation may be recommended in order to guide the selection of procedures.  Patients will be counseled about risks and benefits as well as the typical recovery course after surgery. Surgery is typically not a cure for a person s EVELIN.  However, surgery will often significantly improve one s EVELIN severity (termed  success rate ).  Even in the absence of a cure, surgery will decrease the cardiovascular risk associated with OSA7; improve overall quality of life8 (sleepiness, functionality, sleep  quality, etc).      Palate Procedures:  Patients with EVELIN often have narrowing of their airway in the region of their tonsils and uvula.  The goals of palate procedures are to widen the airway in this region as well as to help the tissues resist collapse.  Modern palate procedure techniques focus on tissue conservation and soft tissue rearrangement, rather than tissue removal.  Often the uvula is preserved in this procedure. Residual sleep apnea is common in patient after pharyngoplasty with an average reduction in sleep apnea events of 33%2.      Tongue Procedures:  ExamWhile patients are awake, the muscles that surround the throat are active and keep this region open for breathing. These muscles relax during sleep, allowing the tongue and other structures to collapse and block breathing.  There are several different tongue procedures available.  Selection of a tongue base procedure depends on characteristics seen on physical exam.  Generally, procedures are aimed at removing bulky tissues in this area or preventing the back of the tongue from falling back during sleep.  Success rates for tongue surgery range from 50-62%3.    Hypoglossal Nerve Stimulation:  Hypoglossal nerve stimulation has recently received approval from the [place] Food and Drug Administration for the treatment of obstructive sleep apnea.  This is based on research showing that the system was safe and effective in treating sleep apnea6.  Results showed that the median AHI score decreased 68%, from 29.3 to 9.0. This therapy uses an implant system that senses breathing patterns and delivers mild stimulation to airway muscles, which keeps the airway open during sleep.  The system consists of three fully implanted components: a small generator (similar in size to a pacemaker), a breathing sensor, and a stimulation lead.  Using a small handheld remote, a patient turns the therapy on before bed and off upon awakening.    Candidates for this  device must be greater than 22 years of age, have moderate to severe EVELIN (AHI between 20-65), BMI less than 32, have tried CPAP/oral appliance without success, and have appropriate upper airway anatomy (determined by a sleep endoscopy performed by Dr. Viera).    Hypoglossal Nerve Stimulation Pathway:    The sleep surgeon s office will work with the patient through the insurance prior-authorization process (including communications and appeals).    Nasal Procedures:  Nasal obstruction can interfere with nasal breathing during the day and night.  Studies have shown that relief of nasal obstruction can improve the ability of some patients to tolerate positive airway pressure therapy for obstructive sleep apnea1.  Treatment options include medications such as nasal saline, topical corticosteroid and antihistamine sprays, and oral medications such as antihistamines or decongestants. Non-surgical treatments can include external nasal dilators for selected patients. If these are not successful by themselves, surgery can improve the nasal airway either alone or in combination with these other options.      Combination Procedures:  Combination of surgical procedures and other treatments may be recommended, particularly if patients have more than one area of narrowing or persistent positional disease.  The success rate of combination surgery ranges from 66-80%2,3.    References  Damien WILLIS. The Role of the Nose in Snoring and Obstructive Sleep Apnoea: An Update.  Eur Arch Otorhinolaryngol. 2011; 268: 1365-73.   Isabela SM; Ayaz JA; Mackenzie JR; Pallanch JF; Kenji MB; Dilcia SG; Jean HOLLOWAY. Surgical modifications of the upper airway for obstructive sleep apnea in adults: a systematic review and meta-analysis. SLEEP 2010;33(10):6908-3294. Garcia GALLARDO. Hypopharyngeal surgery in obstructive sleep apnea: an evidence-based medicine review.  Arch Otolaryngol Head Neck Surg. 2006 Feb;132(2):206-13.  Ge YH1, Catracho Y, Barrett MINNA. The  efficacy of anatomically based multilevel surgery for obstructive sleep apnea. Otolaryngol Head Neck Surg. 2003 Oct;129(4):327-35.  Garcia GALLARDO, Goldberg A. Hypopharyngeal Surgery in Obstructive Sleep Apnea: An Evidence-Based Medicine Review. Arch Otolaryngol Head Neck Surg. 2006 Feb;132(2):206-13.  Rama PJ et al. Upper-Airway Stimulation for Obstructive Sleep Apnea.  N Engl J Med. 2014 Jan 9;370(2):139-49.  Yoon Y et al. Increased Incidence of Cardiovascular Disease in Middle-aged Men with Obstructive Sleep Apnea. Am J Respir Crit Care Med; 2002 166: 159-165  Bashir EM et al. Studying Life Effects and Effectiveness of Palatopharyngoplasty (SLEEP) study: Subjective Outcomes of Isolated Uvulopalatopharyngoplasty. Otolaryngol Head Neck Surg. 2011; 144: 623-631.

## 2025-06-18 NOTE — ASSESSMENT & PLAN NOTE
Patient reports approximately 15% body weight loss since starting tirzepatide. Patient reports improvement in snoring and snort arousals with weight loss but daytime fatigue persists   Will evaluate for sleep disordered breathing    As patient continues to lose weight, consider taper off of Beta-Blocker as he may require less medication for BP control and BB may contribute to daytime fatigue

## 2025-06-18 NOTE — ASSESSMENT & PLAN NOTE
STOP BANG score is 6/8. Patient likely has sleep apnea based on clinical history of EDS(ESS 12/24), HTN, Age > 50, Neck Circumference > 16 inches(40 cm),  Gender, Insomnia(NAS 20/28), Nocturia, GERD Sleep Inertia,  Non-restorative Sleep.   Obstructive sleep apnea reviewed. Complications of Untreated Sleep Apnea Reviewed.  HST/ Polysomnography reviewed. Information provided regarding treatment options for EVELIN.  Recommend Home Sleep Testing to assess for sleep disordered breathing due to high pretest probability for EVELIN.

## 2025-06-18 NOTE — NURSING NOTE
"Chief Complaint   Patient presents with    Sleep Problem     Snoring, improve sleep hygiene        Initial /77   Pulse 79   Resp 15   Ht 1.803 m (5' 10.98\")   Wt 101.2 kg (223 lb)   SpO2 98%   BMI 31.12 kg/m   Estimated body mass index is 31.12 kg/m  as calculated from the following:    Height as of this encounter: 1.803 m (5' 10.98\").    Weight as of this encounter: 101.2 kg (223 lb).    Medication Reconciliation: complete    Neck circumference: 17 inches / 43 centimeters.    DME: N/A      YFN Jasso      "

## 2025-06-18 NOTE — PROGRESS NOTES
[facility]  Consultation Note    Name: Frank Mace MRN#: [id number]   Age: 64 year old YOB: 1960     Date of Consultation: 06/18/25  Consultation is requested by: Sofia Colvin  Primary care provider: Sofia Colvin DO    History of Present Illness:   Frank Mace is a 64 year old male patient with HTN, T2DM, HLD, CKD, Gout   He is sent by Sofia Colvin for a sleep consultation regarding Sleep Problem (Snoring, improve sleep hygiene )  .    Frank Mace main reason for visit: (Patient-Rptd) dr ware  Patient states problem(s) started:    Frank Mace's goals for this visit: (Patient-Rptd) stay asleep mre than one and half hour at a time    He has not had a previous sleep study.    CPAP: No    Assessment and Plan:     Problem List Items Addressed This Visit       Obstructive sleep apnea - Primary    STOP BANG score is 6/8. Patient likely has sleep apnea based on clinical history of EDS(ESS 12/24), HTN, Age > 50, Neck Circumference > 16 inches(40 cm),  Gender, Insomnia(NAS 20/28), Nocturia, GERD Sleep Inertia,  Non-restorative Sleep.   Obstructive sleep apnea reviewed. Complications of Untreated Sleep Apnea Reviewed.  HST/ Polysomnography reviewed. Information provided regarding treatment options for EVELIN.  Recommend Home Sleep Testing to assess for sleep disordered breathing due to high pretest probability for EVELIN.          Relevant Orders    HST-Home Sleep Apnea Test - Noxturnal Returnable    Other fatigue    Patient reports approximately 15% body weight loss since starting tirzepatide. Patient reports improvement in snoring and snort arousals with weight loss but daytime fatigue persists   Will evaluate for sleep disordered breathing    As patient continues to lose weight, consider taper off of Beta-Blocker as he may require less medication for BP control and BB may contribute to daytime fatigue             SLEEP-WAKE SCHEDULE:   Work/School Days: Patient goes  to school/work: (Patient-Rptd) Yes   Usually gets into bed at (Patient-Rptd) 10 pm  Takes patient about (Patient-Rptd) minutes to fall asleep  Has trouble falling asleep   nights per week  Wakes up in the middle of the night (Patient-Rptd) 4 to 5 usually to pee times.  Wakes up due to (Patient-Rptd) Pain;Use the bathroom  He has trouble falling back asleep (Patient-Rptd) every night times a week.   It usually takes (Patient-Rptd) sometimes half hour to get back to sleep  Patient is usually up at (Patient-Rptd) 630am  Uses alarm: (Patient-Rptd) Yes  Sleep Inertia: Yes    Weekends/Non-work Days/All Other Days:  Usually gets into bed at (Patient-Rptd) 10to 11pm   Takes patient about (Patient-Rptd) minutes to fall asleep  Patient is usually up at (Patient-Rptd) 730 to 8am  Uses alarm: (Patient-Rptd) No    Sleep Need  Patient gets  (Patient-Rptd) 8or so sleep on average   Patient thinks He needs about (Patient-Rptd) 7 to 8 sleep    Frank SOLIS Rodri prefers to sleep in this position(s): (Patient-Rptd) Side   Patient states they do the following activities in bed:      Naps  Patient takes a purposeful nap (Patient-Rptd) none times a week and naps are usually   in duration  He feels better after a nap:    He dozes off unintentionally (Patient-Rptd) every evening days per week  Patient has had a driving accident or near-miss due to sleepiness/drowsiness: (Patient-Rptd) No      SLEEP DISRUPTIONS:  Breathing/Snoring  Patient snores:(Patient-Rptd) Yes  Other people complain about His snoring: (Patient-Rptd) No  Patient has been told He stops breathing in His sleep:(Patient-Rptd) No  He has issues with the following: (Patient-Rptd) Stuffy nose when you wake up;Heartburn or reflux at night;Getting up to urinate more than once    Movement:  Patient gets pain, discomfort, with an urge to move:  (Patient-Rptd) Yes  It happens when He is resting:  (Patient-Rptd) Yes  It happens more at night:  (Patient-Rptd) Yes  Patient has been  told Frank Mace kicks His legs at night:  (Patient-Rptd) No     Behaviors in Sleep:  Frank Maec has experienced the following behaviors while sleeping:    He has experienced sudden muscle weakness during the day: (Patient-Rptd) Yes      Is there anything else you would like your sleep provider to know: (Patient-Rptd) some days exhausted at some point      CAFFEINE AND OTHER SUBSTANCES:  Patient consumes caffeinated beverages per day:  (Patient-Rptd) 1 every now and then  Last caffeine use is usually: (Patient-Rptd) morning usually sometimes afternoon  List of any prescribed or over the counter stimulants that patient takes: (Patient-Rptd) none  List of any prescribed or over the counter sleep medication patient takes: (Patient-Rptd) none  List of previous sleep medications that patient has tried: (Patient-Rptd) thc  Patient drinks alcohol to help them sleep: (Patient-Rptd) No  Patient drinks alcohol near bedtime: (Patient-Rptd) No    Family History:  Patient has a family member been diagnosed with a sleep disorder: (Patient-Rptd) Yes  (Patient-Rptd) brother mother cpap father snored loud         SCALES:  EPWORTH SLEEPINESS SCALE       6/18/2025     9:03 AM    [place] Sleepiness Scale ( SHEBA Grider  [id number]<br>ESS - [place]/English - Final version - 21 Nov 07 - [facility] [place].)   Sitting and reading Slight chance of dozing   Watching TV High chance of dozing   Sitting, inactive in a public place (e.g. a theatre or a meeting) Slight chance of dozing   As a passenger in a car for an hour without a break Slight chance of dozing   Lying down to rest in the afternoon when circumstances permit High chance of dozing   Sitting and talking to someone Would never doze   Sitting quietly after a lunch without alcohol High chance of dozing   In a car, while stopped for a few minutes in traffic Would never doze   [place] Score (MC) 12   [place] Score (Sleep) 12        Patient-reported       INSOMNIA SEVERITY  "INDEX (NAS)        6/18/2025     8:34 AM   Insomnia Severity Index (NAS)   Difficulty falling asleep 2   Difficulty staying asleep 3   Problems waking up too early 3   How SATISFIED/DISSATISFIED are you with your CURRENT sleep pattern? 4   How NOTICEABLE to others do you think your sleep problem is in terms of impairing the quality of your life? 2   How WORRIED/DISTRESSED are you about your current sleep problem? 3   To what extent do you consider your sleep problem to INTERFERE with your daily functioning (e.g. daytime fatigue, mood, ability to function at work/daily chores, concentration, memory, mood, etc.) CURRENTLY? 3   NAS Total Score 20        Patient-reported    Guidelines for Scoring/Interpretation:  Total score categories:  0-7 = No clinically significant insomnia   8-14 = Subthreshold insomnia   15-21 = Clinical insomnia (moderate severity)  22-28 = Clinical insomnia (severe)  Used via courtesy of www.[employer].va.gov with permission from Kiko Preciado PhD., [facility]      STOP BANG   EVELIN High Risk             Total Score: 5    EVELIN: Often tired    EVELIN: Hypertension    EVELIN: Over 50 ys old    EVELIN: Neck Circum >16 in    EVELIN: Male          GAD7       No data to display                  CAGE-AID       No data to display              CAGE-AID reprinted with permission from the [place] [employer] Journal, GABBY Harris. and IDANIA Smith, \"Conjoint screening questionnaires for alcohol and drug abuse\" [place] Medical Journal 94: 135-140, 1995.      PATIENT HEALTH QUESTIONNAIRE-9 (PHQ - 9)       No data to display              Developed by Garrett Agudelo, Sailaja Roth, Good Drake and colleagues, with an educational luis from [employer] No permission required to reproduce, translate, display or distribute.      Allergies:    Allergies   Allergen Reactions    Codeine Nausea     And dizzy    Iodinated Contrast Media Other (See Comments)     Avoids for kidney disease    Lactose     Peanuts " [Nuts] Other (See Comments)     Burps for days       Medications:    Current Outpatient Medications   Medication Sig Dispense Refill    allopurinol (ZYLOPRIM) 300 MG tablet Take 1 tablet (300 mg) by mouth daily. 30 tablet 1    ALPHA LIPOIC ACID PO Take 2 tablets by mouth daily.      amLODIPine (NORVASC) 5 MG tablet Take 1 tablet (5 mg) by mouth daily 90 tablet 3    Ascorbic Acid (VITAMIN C PO) Take 2 tablets by mouth daily.      atorvastatin (LIPITOR) 40 MG tablet Take 1 tablet (40 mg) by mouth every evening 90 tablet 3    colchicine (COLCRYS) 0.6 MG tablet Take 1 tablet (0.6 mg) by mouth 2 times daily. 60 tablet 1    empagliflozin (JARDIANCE) 25 MG TABS tablet Take 1 tablet (25 mg) by mouth daily. 90 tablet 1    Ferrous Fumarate (IRON) 18 MG TBCR       Ferrous Sulfate (IRON) 28 MG TABS       hydrochlorothiazide (HYDRODIURIL) 25 MG tablet Take 1 tablet (25 mg) by mouth daily 90 tablet 3    insulin NPH (NOVOLIN N FLEXPEN) 100 UNIT/ML injection Inject 9 units in the morning at the same time as you take the prednisone.  Max dose 30 units daily. 15 mL 1    Insulin Pen Needle (PEN NEEDLES) 32G X 4 MM MISC 1 each daily. 100 each 1    losartan (COZAAR) 25 MG tablet Take 1 tablet (25 mg) by mouth daily 90 tablet 3    metoprolol succinate ER (TOPROL XL) 50 MG 24 hr tablet Take 1 tablet (50 mg) by mouth daily 90 tablet 3    tirzepatide (MOUNJARO) 5 MG/0.5ML SOAJ auto-injector pen Inject 0.5 mLs (5 mg) subcutaneously once a week. 6 mL 0    tirzepatide (MOUNJARO) 5 MG/0.5ML SOAJ auto-injector pen Inject 0.5 mLs (5 mg) subcutaneously every 7 days. 6 mL 0    TURMERIC PO Take 2 tablets by mouth daily.      Vitamin D3 (CHOLECALCIFEROL) 25 mcg (1000 units) tablet Take 1,000 Units by mouth      allopurinol (ZYLOPRIM) 100 MG tablet 100 mg daily. (Patient not taking: Reported on 6/18/2025)      amoxicillin (AMOXIL) 500 MG tablet TAKE 4 TABLETS 1 HOUR BEFORE DENTAL APPOINTMENT. (Patient not taking: Reported on 6/18/2025)      Multiple  Vitamins-Minerals (MULTIVITAL PO) Take 1 tablet by mouth daily  (Patient not taking: Reported on 6/18/2025)      predniSONE (DELTASONE) 20 MG tablet Take 3 tabs by mouth daily x 3 days, then 2 tabs daily x 3 days, then 1 tab daily x 3 days, then 1/2 tab daily x 3 days. (Patient not taking: Reported on 6/18/2025) 20 tablet 0    predniSONE (DELTASONE) 20 MG tablet Take 3 tabs by mouth daily x 3 days, then 2 tabs daily x 3 days, then 1 tab daily x 3 days, then 1/2 tab daily x 3 days. (Patient not taking: Reported on 6/18/2025) 20 tablet 0    predniSONE (DELTASONE) 20 MG tablet 2 tabs for 5 days, then 1 tab days 6-10 (Patient not taking: Reported on 6/18/2025) 15 tablet 0    Tirzepatide (MOUNJARO) 7.5 MG/0.5ML SOAJ auto-injector pen Inject 0.5 mLs (7.5 mg) subcutaneously once a week. 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  No past surgical history on file.    Social History:  Social History     Socioeconomic History    Marital status:      Spouse name: Not on file    Number of children: Not on file    Years of education: Not on file    Highest education level: Not on file   Occupational History    Not on file   Tobacco Use    Smoking status: Never     Passive exposure: Never    Smokeless tobacco: Never   Vaping Use    Vaping status: Never Used   Substance and Sexual Activity    Alcohol use: Yes    Drug use: No    Sexual activity: Not on file   Other Topics Concern    Not on file   Social History Narrative    Not on file     Social Drivers of Health     Financial Resource Strain: Low Risk  (6/20/2024)    Financial Resource Strain     Within the past 12 months, have you or your family members you live with been unable to get utilities (heat, electricity) when  it was really needed?: No   Food Insecurity: Low Risk  (6/20/2024)    Food Insecurity     Within the past 12 months, did you worry that your food would run out before you got money to buy more?: No     Within the past 12 months, did the food you bought just not last and you didn t have money to get more?: No   Transportation Needs: Low Risk  (6/20/2024)    Transportation Needs     Within the past 12 months, has lack of transportation kept you from medical appointments, getting your medicines, non-medical meetings or appointments, work, or from getting things that you need?: No   Physical Activity: Unknown (6/20/2024)    Exercise Vital Sign     Days of Exercise per Week: 0 days     Minutes of Exercise per Session: Not on file   Stress: Stress Concern Present (6/20/2024)    [place] [employer] - Occupational Stress Questionnaire     Feeling of Stress : To some extent   Social Connections: Unknown (6/20/2024)    Social Connection and Isolation Panel [NHANES]     Frequency of Communication with Friends and Family: Not on file     Frequency of Social Gatherings with Friends and Family: Once a week     Attends Buddhism Services: Not on file     Active Member of Clubs or Organizations: Not on file     Attends Club or Organization Meetings: Not on file     Marital Status: Not on file   Interpersonal Safety: Low Risk  (2/26/2025)    Interpersonal Safety     Do you feel physically and emotionally safe where you currently live?: Yes     Within the past 12 months, have you been hit, slapped, kicked or otherwise physically hurt by someone?: No     Within the past 12 months, have you been humiliated or emotionally abused in other ways by your partner or ex-partner?: No   Housing Stability: Low Risk  (6/20/2024)    Housing Stability     Do you have housing? : Yes     Are you worried about losing your housing?: No       Family History:  No family history on file.    Review of Systems:   A complete review of  "systems reviewed by me is negative with the exeption of what has been mentioned in the history of present illness.  In the last TWO WEEKS have you experienced any of the following symptoms?  Fevers: (Patient-Rptd) No  Night Sweats: (Patient-Rptd) Yes  Weight Gain: (Patient-Rptd) No  Pain at Night: (Patient-Rptd) Yes  Double Vision: (Patient-Rptd) No  Changes in Vision: (Patient-Rptd) Yes  Difficulty Breathing through Nose: (Patient-Rptd) No  Sore Throat in Morning: (Patient-Rptd) Yes  Shortness of Breath With Activity: (Patient-Rptd) Yes  Awakening with Shortness of Breath: (Patient-Rptd) No  Increased Cough: (Patient-Rptd) No  Heart Racing at Night: (Patient-Rptd) No  Swelling in Feet or Legs: (Patient-Rptd) Yes  Diarrhea at Night: (Patient-Rptd) Yes  Heartburn at Night: (Patient-Rptd) Yes  Urinating More than Once at Night: (Patient-Rptd) Yes  Losing Control of Urine at Night: (Patient-Rptd) No  Joint Pains at Night: (Patient-Rptd) Yes  Headaches in Morning: (Patient-Rptd) No  Weakness in Arms or Legs: (Patient-Rptd) Yes  Depressed Mood: (Patient-Rptd) No  Anxiety: (Patient-Rptd) No     Physical Exam:   Vitals: /77   Pulse 79   Resp 15   Ht 1.803 m (5' 10.98\")   Wt 101.2 kg (223 lb)   SpO2 98%   BMI 31.12 kg/m    BMI= Body mass index is 31.12 kg/m .  Neck Cir (cm): 43 cm  GENERAL: alert, no distress, obese, and elderly  EYES: Eyes grossly normal to inspection.  No discharge or erythema, or obvious scleral/conjunctival abnormalities.  RESP: No audible wheeze, cough, or visible cyanosis.    SKIN: Visible skin clear. No significant rash, abnormal pigmentation or lesions.  NEURO: Cranial nerves grossly intact.  Mentation and speech appropriate for age.  PSYCH: Appropriate affect, tone, and pace of words         Data: All pertinent previous laboratory data reviewed     Recent Labs   Lab Test 06/04/25  1557 05/27/25  1658 04/11/25  0830   NA  --  137 140   POTASSIUM  --  4.2 4.5   CHLORIDE  --  103 103   CO2 " " --  21* 21*   ANIONGAP  --  13 16*   GLC  --  136* 190*   BUN  --  33.3* 66.0*   CR 1.83* 1.51* 1.64*   MARKY  --  9.5 9.8       Recent Labs   Lab Test 06/04/25  1557   WBC 22.1*   RBC 3.76*   HGB 11.4*   HCT 33.2*   MCV 88   MCH 30.3   MCHC 34.3   RDW 14.3          Recent Labs   Lab Test 06/04/25  1557 05/27/25  1658 04/11/25  0830   PROTTOTAL  --   --  6.9   ALBUMIN  --  3.8 4.2   BILITOTAL  --   --  0.7   ALKPHOS  --   --  96   AST  --   --  19   ALT 16  --  19       TSH (uIU/mL)   Date Value   10/08/2024 1.89       No results found for: \"UAMP\", \"UBARB\", \"BENZODIAZEUR\", \"UCANN\", \"UCOC\", \"OPIT\", \"UPCP\"    Iron Sat Index   Date/Time Value Ref Range Status   02/04/2025 08:37 AM 34 15 - 46 % Final     Ferritin   Date/Time Value Ref Range Status   02/04/2025 08:37 AM 1,136 (H) 31 - 409 ng/mL Final       No results found for: \"PH\", \"PHARTERIAL\", \"PO2\", \"SN9ZLIRGVHE\", \"SAT\", \"PCO2\", \"HCO3\", \"BASEEXCESS\", \"BILL\", \"BEB\"    @LABRCNTIPR(phv:4,pco2v:4,po2v:4,hco3v:4,evelyn:4,o2per:4)@    Echocardiology: No results found for this or any previous visit (from the past 4320 hours).    Chest x-ray:   No results found for this or any previous visit from the past 365 days.      Chest CT:   No results found for this or any previous visit from the past 365 days.      PFT: Most Recent Breeze Pulmonary Function Testing  No results found     Patient was strongly advised to avoid driving, operating any heavy machinery or other hazardous situations while drowsy or sleepy.  Patient was counseled on the importance of driving while alert, to pull over if drowsy, or nap before getting into the vehicle if sleepy.      Plan is for Frank Mace to follow up following HST.     The above note was dictated using voice recognition software. Although reviewed after completion, some word and grammatical error may remain . Please contact the author for any clarifications.    Total time spent reviewing medical records, history and physical " examination, review of previous testing and interpretation as well as documentation on this date, 06/18/25: 45 minutes    Aiden Salazar MD on 06/18/25  [facility]   [address], [address]   [address] [place], [place] [address]. [place] [address]   Appointments: [phone]     CC: Sofia Colvin

## 2025-06-20 ENCOUNTER — ORDERS ONLY (AUTO-RELEASED) (OUTPATIENT)
Dept: FAMILY MEDICINE | Facility: CLINIC | Age: 65
End: 2025-06-20

## 2025-06-20 DIAGNOSIS — I49.9 IRREGULAR HEART RATE: ICD-10-CM

## 2025-06-23 ENCOUNTER — RESULTS FOLLOW-UP (OUTPATIENT)
Dept: FAMILY MEDICINE | Facility: CLINIC | Age: 65
End: 2025-06-23
Payer: COMMERCIAL

## 2025-06-23 DIAGNOSIS — N18.30 STAGE 3 CHRONIC KIDNEY DISEASE, UNSPECIFIED WHETHER STAGE 3A OR 3B CKD (H): ICD-10-CM

## 2025-06-23 DIAGNOSIS — I10 BENIGN ESSENTIAL HYPERTENSION: ICD-10-CM

## 2025-06-23 RX ORDER — LOSARTAN POTASSIUM 25 MG/1
75 TABLET ORAL DAILY
Qty: 90 TABLET | Refills: 3 | Status: SHIPPED | OUTPATIENT
Start: 2025-06-23

## 2025-06-23 NOTE — TELEPHONE ENCOUNTER
Med Issue    Who is Calling: patient    Update: Pt saw Dr. Colvin last Friday and she refilled all of his meds.  Pt states that dose is wrong for Losartan.  It should be 75mg daily.  That's is the med and dose his Nephrologist wants him on, per last visit with him 6/2 (See note in chart.)  Please resend Rx for Losartan 75 mg to [facility] ASAP - Pt wants to  today.  No need to call patient back, unless there are questions or problems.      Does caller want a call/response back: No

## 2025-06-23 NOTE — TELEPHONE ENCOUNTER
See note below regarding incorrect dose of Losartan currently ordered.     Per 6-2-25 Nephrology dictation:     Hypertension    Well controlled until 2022/2023, and then increased to SBP up to around 200 at times. Blood pressure now improved, today 134/70.    Continue amlodipine 5 mg daily (does limited by side effects), losartan 75 mg daily (have had hyperK in past), HCTZ 25 mg daily, metop XL 50 mg daily.   Amlodipine dose limited to 5 mg due to side effects.   Possible DANNY. Renin elevated, charity wnl, and serum metanephrines wnl. stable/improved BP control. Will get additional imaging if Cr rising, BP unable to be uncontrolled, or develops HF.     Please advise. Alexandrea Pace RN

## 2025-06-25 ENCOUNTER — OFFICE VISIT (OUTPATIENT)
Dept: SLEEP MEDICINE | Facility: CLINIC | Age: 65
End: 2025-06-25
Attending: STUDENT IN AN ORGANIZED HEALTH CARE EDUCATION/TRAINING PROGRAM
Payer: COMMERCIAL

## 2025-06-25 DIAGNOSIS — G47.33 OBSTRUCTIVE SLEEP APNEA: ICD-10-CM

## 2025-06-26 ENCOUNTER — DOCUMENTATION ONLY (OUTPATIENT)
Dept: SLEEP MEDICINE | Facility: CLINIC | Age: 65
End: 2025-06-26
Payer: COMMERCIAL

## 2025-06-30 NOTE — PROGRESS NOTES
This HSAT was performed using a Noxturnal T3 device which recorded snore, sound, movement activity, body position, nasal pressure, oronasal thermal airflow, pulse, oximetry and both chest and abdominal respiratory effort. HSAT data was restricted to the time patient states they were in bed.     HSAT was scored using 1B 4% hypopnea rule.     HST AHI (Non-PAT): 68.7  Snoring was reported as loud.  Time with SpO2 below 89% was 65.5 minutes.   Overall signal quality was good     Pt will follow up with sleep provider to determine appropriate therapy.

## 2025-06-30 NOTE — PROGRESS NOTES
Pt is completing a home sleep test. Pt was instructed on how to put on the Noxturnal T3 device and associated equipment before going to bed and given the opportunity to practice putting it on before leaving the sleep center. Pt was reminded to bring the home sleep test kit back to the center tomorrow, at the scheduled time for download and reporting. Patient was instructed to complete study using the following treatment?  None  Neck circumference: 43 CM / 17 inches.  ESS: 12  Stop Ban  Device number: 44

## 2025-06-30 NOTE — PROGRESS NOTES
HST POST-STUDY QUESTIONNAIRE    What time did you go to bed?  9:06 pm  How long do you think it took to fall asleep?  10 min  What time did you wake up to start the day?  6:30 am  Did you get up during the night at all?  yes  If you woke up, do you remember approximately what time(s)? 10:46 pm, 12:55 am, ? , 3:22 am, 5:30 am  Did you have any difficulty with the equipment?  No  Did you us any type of treatment with this study?  None  Was the head of the bed elevated? No  Did you sleep in a recliner?  No  Did you stop using CPAP at least 3 days before this test?  NA  Any other information you'd like us to know? I got up 4 or 5 times to pee.

## 2025-07-03 ENCOUNTER — MYC MEDICAL ADVICE (OUTPATIENT)
Dept: EDUCATION SERVICES | Facility: CLINIC | Age: 65
End: 2025-07-03
Payer: COMMERCIAL

## 2025-07-03 DIAGNOSIS — E11.9 TYPE 2 DIABETES MELLITUS WITHOUT COMPLICATION, WITHOUT LONG-TERM CURRENT USE OF INSULIN (H): ICD-10-CM

## 2025-07-03 RX ORDER — HUMAN INSULIN 100 [IU]/ML
INJECTION, SUSPENSION SUBCUTANEOUS
Qty: 15 ML | Refills: 1 | Status: SHIPPED | OUTPATIENT
Start: 2025-07-03

## 2025-07-03 NOTE — TELEPHONE ENCOUNTER
Diabetes Education Follow-up    Subjective/Objective:    Frank WILL Baxternatty sent in blood glucose log for review. Last date of communication was: 6/17/25.    Diabetes is being managed with Diabetes Medications   Diabetes Medication(s)       Insulin       insulin NPH (NOVOLIN N FLEXPEN) 100 UNIT/ML injection Inject 9 units in the morning at the same time as you take the prednisone.  Max dose 30 units daily.       Sodium-Glucose Co-Transporter 2 (SGLT2) Inhibitors       empagliflozin (JARDIANCE) 25 MG TABS tablet Take 1 tablet (25 mg) by mouth daily.       Incretin Mimetic Agents       tirzepatide (MOUNJARO) 5 MG/0.5ML SOAJ auto-injector pen Inject 0.5 mLs (5 mg) subcutaneously once a week.     tirzepatide (MOUNJARO) 5 MG/0.5ML SOAJ auto-injector pen Inject 0.5 mLs (5 mg) subcutaneously every 7 days.     Tirzepatide (MOUNJARO) 7.5 MG/0.5ML SOAJ auto-injector pen Inject 0.5 mLs (7.5 mg) subcutaneously once a week.        6/17/25 NPH was increased to 12 units, patient independently increased it to 15 units 3 days ago    BG/Food Log:   Reported readings via [employer]  FASTING BLOOD SUGAR: 130-140  Before lunch, after lunch and before supper 150's    Assessment:    Fasting blood glucose: 0% in target.  After breakfast glucose: -*% in target.  Before lunch glucose: 0% in target.  After lunch glucose: 100% in target.  Before dinner glucose: 0% in target.  After dinner glucose: -% in target.  Bedtime glucose: -% in target.    No longer having readings in the 200's.      Plan/Response:  See Patient Instructions for co-developed, patient-stated behavior change goals.  Recommend increase to insulin - NPH 17 units  Send in BG readings in 2 weeks    Kira Sotomayor RD, [facility], 12:25 PM, 7/3/2025      Any diabetes medication dose changes were made via the CDE Protocol and Collaborative Practice Agreement with the patient's referring provider. A copy of this encounter was shared with the provider.

## 2025-07-08 ENCOUNTER — LAB (OUTPATIENT)
Dept: LAB | Facility: CLINIC | Age: 65
End: 2025-07-08
Payer: COMMERCIAL

## 2025-07-08 DIAGNOSIS — Z87.39 HISTORY OF GOUT: ICD-10-CM

## 2025-07-08 DIAGNOSIS — D47.2 MGUS (MONOCLONAL GAMMOPATHY OF UNKNOWN SIGNIFICANCE): ICD-10-CM

## 2025-07-08 DIAGNOSIS — E79.0 HYPERURICEMIA: ICD-10-CM

## 2025-07-08 DIAGNOSIS — D64.9 ANEMIA, UNSPECIFIED TYPE: ICD-10-CM

## 2025-07-08 DIAGNOSIS — E78.5 HYPERLIPIDEMIA, UNSPECIFIED HYPERLIPIDEMIA TYPE: ICD-10-CM

## 2025-07-08 DIAGNOSIS — R10.13 EPIGASTRIC PAIN: ICD-10-CM

## 2025-07-08 DIAGNOSIS — M1A.9XX0 CHRONIC GOUT WITHOUT TOPHUS, UNSPECIFIED CAUSE, UNSPECIFIED SITE: ICD-10-CM

## 2025-07-08 DIAGNOSIS — I49.9 IRREGULAR HEART RATE: ICD-10-CM

## 2025-07-08 LAB
ALBUMIN SERPL BCG-MCNC: 4.2 G/DL (ref 3.5–5.2)
ALP SERPL-CCNC: 98 U/L (ref 40–150)
ALT SERPL W P-5'-P-CCNC: 16 U/L (ref 0–70)
ANION GAP SERPL CALCULATED.3IONS-SCNC: 17 MMOL/L (ref 7–15)
AST SERPL W P-5'-P-CCNC: 26 U/L (ref 0–45)
BASOPHILS # BLD AUTO: 0 10E3/UL (ref 0–0.2)
BASOPHILS NFR BLD AUTO: 0 %
BILIRUB SERPL-MCNC: 0.8 MG/DL
BUN SERPL-MCNC: 23.2 MG/DL (ref 8–23)
CALCIUM SERPL-MCNC: 9.6 MG/DL (ref 8.8–10.4)
CHLORIDE SERPL-SCNC: 105 MMOL/L (ref 98–107)
CHOLEST SERPL-MCNC: 132 MG/DL
CREAT SERPL-MCNC: 1.39 MG/DL (ref 0.67–1.17)
EGFRCR SERPLBLD CKD-EPI 2021: 57 ML/MIN/1.73M2
EOSINOPHIL # BLD AUTO: 0.2 10E3/UL (ref 0–0.7)
EOSINOPHIL NFR BLD AUTO: 3 %
ERYTHROCYTE [DISTWIDTH] IN BLOOD BY AUTOMATED COUNT: 15.8 % (ref 10–15)
FASTING STATUS PATIENT QL REPORTED: YES
FASTING STATUS PATIENT QL REPORTED: YES
FERRITIN SERPL-MCNC: 1103 NG/ML (ref 31–409)
GLUCOSE SERPL-MCNC: 143 MG/DL (ref 70–99)
HCO3 SERPL-SCNC: 22 MMOL/L (ref 22–29)
HCT VFR BLD AUTO: 37.6 % (ref 40–53)
HDLC SERPL-MCNC: 28 MG/DL
HGB BLD-MCNC: 11.9 G/DL (ref 13.3–17.7)
IMM GRANULOCYTES # BLD: 0 10E3/UL
IMM GRANULOCYTES NFR BLD: 1 %
IRON BINDING CAPACITY (ROCHE): 268 UG/DL (ref 240–430)
IRON SATN MFR SERPL: 30 % (ref 15–46)
IRON SERPL-MCNC: 81 UG/DL (ref 61–157)
LDLC SERPL CALC-MCNC: 77 MG/DL
LIPASE SERPL-CCNC: 35 U/L (ref 13–60)
LYMPHOCYTES # BLD AUTO: 1.2 10E3/UL (ref 0.8–5.3)
LYMPHOCYTES NFR BLD AUTO: 18 %
MCH RBC QN AUTO: 29.8 PG (ref 26.5–33)
MCHC RBC AUTO-ENTMCNC: 31.6 G/DL (ref 31.5–36.5)
MCV RBC AUTO: 94 FL (ref 78–100)
MONOCYTES # BLD AUTO: 0.7 10E3/UL (ref 0–1.3)
MONOCYTES NFR BLD AUTO: 10 %
NEUTROPHILS # BLD AUTO: 4.4 10E3/UL (ref 1.6–8.3)
NEUTROPHILS NFR BLD AUTO: 68 %
NONHDLC SERPL-MCNC: 104 MG/DL
PLATELET # BLD AUTO: 282 10E3/UL (ref 150–450)
POTASSIUM SERPL-SCNC: 3.9 MMOL/L (ref 3.4–5.3)
PROT SERPL-MCNC: 6.3 G/DL (ref 6.4–8.3)
RBC # BLD AUTO: 4 10E6/UL (ref 4.4–5.9)
RETICS # AUTO: 0.1 10E6/UL (ref 0.03–0.1)
RETICS/RBC NFR AUTO: 2.7 % (ref 0.5–2)
SODIUM SERPL-SCNC: 144 MMOL/L (ref 135–145)
TOTAL PROTEIN SERUM FOR ELP: 5.9 G/DL (ref 6.4–8.3)
TRIGL SERPL-MCNC: 133 MG/DL
TSH SERPL DL<=0.005 MIU/L-ACNC: 2.57 UIU/ML (ref 0.3–4.2)
URATE SERPL-MCNC: 5.5 MG/DL (ref 3.4–7)
WBC # BLD AUTO: 6.4 10E3/UL (ref 4–11)

## 2025-07-08 PROCEDURE — 83690 ASSAY OF LIPASE: CPT

## 2025-07-08 PROCEDURE — 83550 IRON BINDING TEST: CPT

## 2025-07-08 PROCEDURE — 82728 ASSAY OF FERRITIN: CPT

## 2025-07-08 PROCEDURE — 83521 IG LIGHT CHAINS FREE EACH: CPT

## 2025-07-08 PROCEDURE — 85045 AUTOMATED RETICULOCYTE COUNT: CPT

## 2025-07-08 PROCEDURE — 84155 ASSAY OF PROTEIN SERUM: CPT | Mod: 59

## 2025-07-08 PROCEDURE — 80061 LIPID PANEL: CPT

## 2025-07-08 PROCEDURE — 85060 BLOOD SMEAR INTERPRETATION: CPT | Performed by: PATHOLOGY

## 2025-07-08 PROCEDURE — 36415 COLL VENOUS BLD VENIPUNCTURE: CPT

## 2025-07-08 PROCEDURE — 83540 ASSAY OF IRON: CPT

## 2025-07-08 PROCEDURE — 80053 COMPREHEN METABOLIC PANEL: CPT

## 2025-07-08 PROCEDURE — 85025 COMPLETE CBC W/AUTO DIFF WBC: CPT

## 2025-07-08 PROCEDURE — 84443 ASSAY THYROID STIM HORMONE: CPT

## 2025-07-08 PROCEDURE — 84550 ASSAY OF BLOOD/URIC ACID: CPT

## 2025-07-08 PROCEDURE — 84165 PROTEIN E-PHORESIS SERUM: CPT | Performed by: PATHOLOGY

## 2025-07-09 ENCOUNTER — TELEPHONE (OUTPATIENT)
Dept: FAMILY MEDICINE | Facility: CLINIC | Age: 65
End: 2025-07-09

## 2025-07-09 ENCOUNTER — OFFICE VISIT (OUTPATIENT)
Dept: RHEUMATOLOGY | Facility: CLINIC | Age: 65
End: 2025-07-09
Payer: COMMERCIAL

## 2025-07-09 ENCOUNTER — MYC MEDICAL ADVICE (OUTPATIENT)
Dept: SLEEP MEDICINE | Facility: CLINIC | Age: 65
End: 2025-07-09

## 2025-07-09 VITALS
WEIGHT: 230 LBS | SYSTOLIC BLOOD PRESSURE: 145 MMHG | BODY MASS INDEX: 32.1 KG/M2 | OXYGEN SATURATION: 97 % | DIASTOLIC BLOOD PRESSURE: 67 MMHG | HEART RATE: 75 BPM

## 2025-07-09 DIAGNOSIS — G47.33 OBSTRUCTIVE SLEEP APNEA: Primary | ICD-10-CM

## 2025-07-09 DIAGNOSIS — Z87.39 HISTORY OF GOUT: Primary | ICD-10-CM

## 2025-07-09 DIAGNOSIS — E79.0 HYPERURICEMIA: ICD-10-CM

## 2025-07-09 DIAGNOSIS — Z71.89 ENCOUNTER TO DISCUSS TREATMENT OPTIONS: ICD-10-CM

## 2025-07-09 LAB
ALBUMIN SERPL ELPH-MCNC: 3.9 G/DL (ref 3.7–5.1)
ALPHA1 GLOB SERPL ELPH-MCNC: 0.2 G/DL (ref 0.2–0.4)
ALPHA2 GLOB SERPL ELPH-MCNC: 0.8 G/DL (ref 0.5–0.9)
B-GLOBULIN SERPL ELPH-MCNC: 0.6 G/DL (ref 0.6–1)
CV ZIO PRELIM RESULTS: NORMAL
GAMMA GLOB SERPL ELPH-MCNC: 0.4 G/DL (ref 0.7–1.6)
KAPPA LC FREE SER-MCNC: 1.57 MG/DL (ref 0.33–1.94)
KAPPA LC FREE/LAMBDA FREE SER NEPH: 0.64 {RATIO} (ref 0.26–1.65)
LAMBDA LC FREE SERPL-MCNC: 2.44 MG/DL (ref 0.57–2.63)
LOCATION OF TASK: ABNORMAL
M PROTEIN SERPL ELPH-MCNC: 0.1 G/DL
PATH REPORT.COMMENTS IMP SPEC: NORMAL
PATH REPORT.FINAL DX SPEC: NORMAL
PATH REPORT.MICROSCOPIC SPEC OTHER STN: NORMAL
PATH REPORT.MICROSCOPIC SPEC OTHER STN: NORMAL
PROT PATTERN SERPL ELPH-IMP: ABNORMAL

## 2025-07-09 PROCEDURE — 99213 OFFICE O/P EST LOW 20 MIN: CPT | Performed by: INTERNAL MEDICINE

## 2025-07-09 NOTE — PROGRESS NOTES
"  Assessment and Plan      1.  Gout.  We increased his allopurinol dose to 300 mg daily, his uric acid came down to 5.5 which is excellent and therapeutic.  He will stay on 3 mg allopurinol daily.    He will reduce colchicine to 0.6 mg Wsrtdb-Lznkslkcu-Hdmhyf for another 3 weeks and in August he can stop colchicine.    We will check his uric acid and creatinine in October and see him in follow-up in 6 months.    CC PCP          Rheumatology follow-up visit note         HPI        Frank is a very nice 64-year-old gentleman with a history of diabetes, hyperuricemia.  In 2024 he underwent a right knee arthroplasty, subsequently he started noticing intense acute flares of painful joint swelling involving his ankles, feet, the right wrist.  He has been on at least 5 courses of prednisone over the past 6-8 months, the most recent one being a couple of days ago.  His baseline uric acid was high (10.4).  About 4-5 months ago his PCP started him on allopurinol (initially 50 mg, then 100 mg, currently 200 mg allopurinol daily).  His uric acid did come down to 9.6, 8.4 but is still high.  The goal would be keeping the uric acid level below 6 Mg/DL for optimum gout control.  He has had painful swelling of both ankles, the right wrist multiple times since his first attack last year.  His dad also had gout.  Brother also had gout.  Family history is negative for rheumatoid arthritis.  His dad had psoriasis.  During the acute episodes of joint flare the pain is intense and he \"can hardly walk \".  In between episodes the feet and ankles are comfortable but the right wrist hurts persistently, as he comes off prednisone.  He is on his fifth course of prednisone.  He has diabetes for 10 years and prednisone is affecting his diabetes control.  He has seen podiatry who did MRI of the feet which were nondiagnostic.    Labs: Uric acid 8.4 (prior uric acid 9.6, 10.4), creatinine 1.51 (range 1.41-1.64), calcium 9.5, albumin 3.8, TSH 1.89, " WBC 9.1, hemoglobin 13.2, platelets 233.    Social history.  Alcohol use is rare (1-2 drinks a year).  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        DETAILED EXAMINATION  07/09/25  :    Vitals:    07/09/25 1601   BP: (!) 145/67   BP Location: Right arm   Patient Position: Sitting   Cuff Size: Adult Large   Pulse: 75   SpO2: 97%   Weight: 104.3 kg (230 lb)       Alert and oriented x 3.    Patient is alert and oriented x 3.    Head/neck: No facial asymmetry, no rash, no jaundice, no conjunctival pallor, no alopecia    Lungs clear    Heart sounds regular    Left hand.  No swelling or synovitis of the fingers, no tenderness of individual MCP, PIP and DIP knuckles    Right hand.  No swelling or synovitis of the fingers noted, no tenderness of individual MCP, PIP and DIP knuckles.    Elbows normal range of motion    Shoulders normal ROM    Hips pain-free ROM    Knees normal range of motion    Ankles no swelling or redness        Patient Active Problem List    Diagnosis Date Noted    Obstructive sleep apnea 06/18/2025     Priority: Medium    Other fatigue 06/18/2025     Priority: Medium    Arthrosis 04/29/2013     Priority: Medium     No past surgical history on file.   No past medical history on file.  Allergies   Allergen Reactions    Codeine Nausea     And dizzy    Iodinated Contrast Media Other (See Comments)     Avoids for kidney disease    Lactose     Peanuts [Nuts] Other (See Comments)     Burps for days     Current Outpatient Medications   Medication Sig Dispense Refill    allopurinol (ZYLOPRIM) 300 MG tablet Take 1 tablet (300 mg) by mouth daily. 30 tablet 1    ALPHA LIPOIC ACID PO Take 2 tablets by mouth daily.      amLODIPine (NORVASC) 5 MG tablet Take 1 tablet (5 mg) by mouth daily 90 tablet 3    Ascorbic Acid (VITAMIN C PO) Take 2 tablets by mouth daily.      atorvastatin (LIPITOR) 40 MG tablet Take 1 tablet (40 mg) by mouth every evening. 90 tablet 3    colchicine (COLCRYS) 0.6 MG tablet  Take 1 tablet (0.6 mg) by mouth 2 times daily. (Patient taking differently: Take 0.6 mg by mouth daily.) 60 tablet 1    empagliflozin (JARDIANCE) 25 MG TABS tablet Take 1 tablet (25 mg) by mouth daily. 90 tablet 1    hydrochlorothiazide (HYDRODIURIL) 25 MG tablet Take 1 tablet (25 mg) by mouth daily. 90 tablet 3    insulin NPH (NOVOLIN N FLEXPEN) 100 UNIT/ML injection Inject 17 units in the morning at the same time as you take the prednisone.  Max dose 30 units daily. 15 mL 1    losartan (COZAAR) 25 MG tablet Take 3 tablets (75 mg) by mouth daily. 90 tablet 3    metoprolol succinate ER (TOPROL XL) 50 MG 24 hr tablet Take 1 tablet (50 mg) by mouth daily. 90 tablet 3    tirzepatide (MOUNJARO) 5 MG/0.5ML SOAJ auto-injector pen Inject 0.5 mLs (5 mg) subcutaneously every 7 days. 6 mL 0    TURMERIC PO Take 2 tablets by mouth daily.      Vitamin D3 (CHOLECALCIFEROL) 25 mcg (1000 units) tablet Take 1,000 Units by mouth      amoxicillin (AMOXIL) 500 MG tablet TAKE 4 TABLETS 1 HOUR BEFORE DENTAL APPOINTMENT. (Patient not taking: Reported on 6/20/2025)      Ferrous Fumarate (IRON) 18 MG TBCR  (Patient not taking: Reported on 6/20/2025)      Ferrous Sulfate (IRON) 28 MG TABS  (Patient not taking: Reported on 7/9/2025)      Ferrous Sulfate 324 (65 Fe) MG TBEC Take by mouth. (Patient not taking: Reported on 7/9/2025)      gabapentin (GRALISE) 300 MG 24 hr tablet Take by mouth daily (with dinner). (Patient not taking: Reported on 7/9/2025)      Insulin Pen Needle (PEN NEEDLES) 32G X 4 MM MISC 1 each daily. 100 each 1    Multiple Vitamins-Minerals (MULTIVITAL PO) Take 1 tablet by mouth daily  (Patient not taking: Reported on 7/9/2025)      tirzepatide (MOUNJARO) 5 MG/0.5ML SOAJ auto-injector pen Inject 0.5 mLs (5 mg) subcutaneously once a week. (Patient not taking: Reported on 7/9/2025) 6 mL 0    Tirzepatide (MOUNJARO) 7.5 MG/0.5ML SOAJ auto-injector pen Inject 0.5 mLs (7.5 mg) subcutaneously once a week. (Patient not taking:  Reported on 7/9/2025) 2 mL 1     family history is not on file.  Social Connections: Unknown (6/20/2025)    Social Connection and Isolation Panel [NHANES]     Frequency of Communication with Friends and Family: Not on file     Frequency of Social Gatherings with Friends and Family: Once a week     Attends [employer] Services: Not on file     Active Member of Clubs or Organizations: Not on file     Attends Club or Organization Meetings: Not on file     Marital Status: Not on file          WBC   Date Value Ref Range Status   02/04/2020 14.2 (H) 4.0 - 11.0 10e9/L Final     WBC Count   Date Value Ref Range Status   07/08/2025 6.4 4.0 - 11.0 10e3/uL Final     RBC Count   Date Value Ref Range Status   07/08/2025 4.00 (L) 4.40 - 5.90 10e6/uL Final   02/04/2020 4.72 4.4 - 5.9 10e12/L Final     Hemoglobin   Date Value Ref Range Status   07/08/2025 11.9 (L) 13.3 - 17.7 g/dL Final   02/04/2020 14.5 13.3 - 17.7 g/dL Final     Hematocrit   Date Value Ref Range Status   07/08/2025 37.6 (L) 40.0 - 53.0 % Final   02/04/2020 43.4 40.0 - 53.0 % Final     MCV   Date Value Ref Range Status   07/08/2025 94 78 - 100 fL Final   02/04/2020 92 78 - 100 fl Final     MCH   Date Value Ref Range Status   07/08/2025 29.8 26.5 - 33.0 pg Final   02/04/2020 30.7 26.5 - 33.0 pg Final     Platelet Count   Date Value Ref Range Status   07/08/2025 282 150 - 450 10e3/uL Final   02/04/2020 209 150 - 450 10e9/L Final     % Lymphocytes   Date Value Ref Range Status   07/08/2025 18 % Final   02/04/2020 10.3 % Final     AST   Date Value Ref Range Status   07/08/2025 26 0 - 45 U/L Final   02/04/2020 15 0 - 45 U/L Final     ALT   Date Value Ref Range Status   07/08/2025 16 0 - 70 U/L Final   02/04/2020 33 0 - 70 U/L Final     Albumin   Date Value Ref Range Status   07/08/2025 4.2 3.5 - 5.2 g/dL Final   02/04/2020 3.3 (L) 3.4 - 5.0 g/dL Final     Alkaline Phosphatase   Date Value Ref Range Status   07/08/2025 98 40 - 150 U/L Final   02/04/2020 62 40 - 150 U/L  Final     Creatinine   Date Value Ref Range Status   07/08/2025 1.39 (H) 0.67 - 1.17 mg/dL Final   02/04/2020 1.00 0.66 - 1.25 mg/dL Final     GFR Estimate   Date Value Ref Range Status   07/08/2025 57 (L) >60 mL/min/1.73m2 Final     Comment:     eGFR calculated using 2021 CKD-EPI equation.   02/04/2020 82 >60 mL/min/[1.73_m2] Final     Comment:     Non  GFR Calc  Starting 12/18/2018, serum creatinine based estimated GFR (eGFR) will be   calculated using the Chronic Kidney Disease Epidemiology Collaboration   (CKD-EPI) equation.       GFR Estimate If Black   Date Value Ref Range Status   02/04/2020 >90 >60 mL/min/[1.73_m2] Final     Comment:      GFR Calc  Starting 12/18/2018, serum creatinine based estimated GFR (eGFR) will be   calculated using the Chronic Kidney Disease Epidemiology Collaboration   (CKD-EPI) equation.       Creatinine Urine mg/dL   Date Value Ref Range Status   05/27/2025 114.2 mg/dL Final     Comment:     The reference ranges have not been established in urine creatinine. The results should be integrated into the clinical context for interpretation.     Erythrocyte Sedimentation Rate   Date Value Ref Range Status   06/04/2025 51 (H) 0 - 20 mm/hr Final

## 2025-07-09 NOTE — TELEPHONE ENCOUNTER
Forwarding to PCP as FYI.  Patient calls to inform that he was not fasting for recent lab tests, even though the lab noted that he was.   We did discuss that the only test this would really affect is the lipid panel and specifically triglycerides and LDL, which were normal despite non-fasting.    Will forward to PCP as FYI and for any further insight or recommendations.     Thanks,  Jayashree Vu RN  [facility]

## 2025-07-21 ENCOUNTER — DOCUMENTATION ONLY (OUTPATIENT)
Dept: SLEEP MEDICINE | Facility: CLINIC | Age: 65
End: 2025-07-21
Payer: COMMERCIAL

## 2025-07-21 DIAGNOSIS — G47.33 OSA (OBSTRUCTIVE SLEEP APNEA): Primary | ICD-10-CM

## 2025-07-21 NOTE — PROGRESS NOTES
Patient was offered choice of vendor and chose [facility].  Patient Frank Mace was set up at [place]  on July 21, 2025. Patient received a Resmed Airsense 11 Pressures were set at 7-17 cm H2O.   Patient s ramp is 5 cm H2O for Auto and FLEX/EPR is EPR, 2.  Patient received a Resmed Mask name: AIRFIT [id number]  Nasal mask size Large, heated tubing and heated humidifier.  Patient has the following compliance requirements: using and visit requirements  Patient has a follow up on TBD with Dr Salazar.    Rosemary Jimenez

## 2025-07-23 ENCOUNTER — MYC MEDICAL ADVICE (OUTPATIENT)
Dept: EDUCATION SERVICES | Facility: CLINIC | Age: 65
End: 2025-07-23
Payer: COMMERCIAL

## 2025-07-23 DIAGNOSIS — E11.9 TYPE 2 DIABETES MELLITUS WITHOUT COMPLICATION, WITHOUT LONG-TERM CURRENT USE OF INSULIN (H): Primary | ICD-10-CM

## 2025-07-24 ENCOUNTER — DOCUMENTATION ONLY (OUTPATIENT)
Dept: SLEEP MEDICINE | Facility: CLINIC | Age: 65
End: 2025-07-24
Payer: COMMERCIAL

## 2025-07-24 DIAGNOSIS — I10 BENIGN ESSENTIAL HYPERTENSION: ICD-10-CM

## 2025-07-24 RX ORDER — ACYCLOVIR 400 MG/1
1 TABLET ORAL ONCE
Qty: 1 EACH | Refills: 0 | Status: SHIPPED | OUTPATIENT
Start: 2025-07-24 | End: 2025-07-24

## 2025-07-24 RX ORDER — HUMAN INSULIN 100 [IU]/ML
INJECTION, SUSPENSION SUBCUTANEOUS
Qty: 15 ML | Refills: 1 | Status: SHIPPED | OUTPATIENT
Start: 2025-07-24

## 2025-07-24 RX ORDER — AMLODIPINE BESYLATE 5 MG/1
5 TABLET ORAL DAILY
Qty: 90 TABLET | Refills: 3 | Status: SHIPPED | OUTPATIENT
Start: 2025-07-24

## 2025-07-24 RX ORDER — ACYCLOVIR 400 MG/1
1 TABLET ORAL
Qty: 9 EACH | Refills: 5 | Status: SHIPPED | OUTPATIENT
Start: 2025-07-24

## 2025-07-24 NOTE — PROGRESS NOTES
3 day Sleep therapy management telephone visit    Diagnostic AHI: HST: 68.7        Confirmed with patient at time of call- N/A Patient is still interested in STM service       Message left for patient to return call        Objective data     Order Settings for PAP  CPAP min     CPAP max     CPAP fixed         Device settings from machine CPAP min 7.0     CPAP max 17.0      CPAP fixed      EPR Setting TWO    RESMED soft response  OFF     Assessment: Nightly usage over four hours      Patient has the following upcoming sleep appts:      Replacement device: No  STM ordered by provider: Yes     Total time spent on accessing and  interpreting remote patient PAP therapy data  10 minutes    Total time spent counseling, coaching  and reviewing PAP therapy data with patient  1 minutes    [id number] no

## 2025-07-24 NOTE — TELEPHONE ENCOUNTER
Diabetes Education Follow-up    Subjective/Objective:    Frank WILL Baxternatty sent in blood glucose log for review. Last date of communication was: 6/17/25.    Diabetes is being managed with Diabetes Medications   Diabetes Medication(s)       Insulin       insulin NPH (NOVOLIN N FLEXPEN) 100 UNIT/ML injection Inject 17 units in the morning at the same time as you take the prednisone.  Max dose 30 units daily.       Sodium-Glucose Co-Transporter 2 (SGLT2) Inhibitors       empagliflozin (JARDIANCE) 25 MG TABS tablet Take 1 tablet (25 mg) by mouth daily.       Incretin Mimetic Agents       tirzepatide (MOUNJARO) 5 MG/0.5ML SOAJ auto-injector pen Inject 0.5 mLs (5 mg) subcutaneously once a week.     Patient not taking: Reported on 7/9/2025     Tirzepatide (MOUNJARO) 7.5 MG/0.5ML SOAJ auto-injector pen Inject 0.5 mLs (7.5 mg) subcutaneously once a week.     Patient not taking: Reported on 7/9/2025            BG/Food Log:       Assessment:    Fasting blood glucose: 17% in target.  After breakfast glucose: -% in target.  Before lunch glucose: 0% in target.  After lunch glucose: -% in target.  Before dinner glucose: 100% in target.  After dinner glucose: -% in target.  Bedtime glucose: 50% in target.    We discussed use of personal or professional cgms on 4/23/25 and at the time, patient chose professional cgms.  He now would like to get personal cgms.    Prescriptions placed for Dexcom G7  and sensors.     Plan/Response:  Increase NPH to 14 units  Prescriptions placed for Dexcom G7  and sensors (sent to [facility] - sometimes we have to send them to DME, but we will first try [facility]).    Kira Sotomayor RD, [facility], 7:56 AM, 7/24/2025      Any diabetes medication dose changes were made via the CDE Protocol and Collaborative Practice Agreement with the patient's referring provider and primary care provider. A copy of this encounter was shared with the provider.

## 2025-07-29 DIAGNOSIS — Z87.39 HISTORY OF GOUT: ICD-10-CM

## 2025-07-29 DIAGNOSIS — E79.0 HYPERURICEMIA: ICD-10-CM

## 2025-07-29 RX ORDER — ALLOPURINOL 300 MG/1
1 TABLET ORAL DAILY
Qty: 30 TABLET | Refills: 1 | Status: SHIPPED | OUTPATIENT
Start: 2025-07-29

## 2025-07-29 RX ORDER — COLCHICINE 0.6 MG/1
0.6 TABLET ORAL 2 TIMES DAILY
Qty: 60 TABLET | Refills: 1 | OUTPATIENT
Start: 2025-07-29

## 2025-07-29 NOTE — CONFIDENTIAL NOTE
He will reduce colchicine to 0.6 mg Vypddq-Mivyflaya-Gmtkxf for another 3 weeks and in August he can stop colchicine       Colchicine dosage reduced in July. Pt's rx was for 2 times daily. Pt should have enough until pt stops taking

## 2025-08-05 ENCOUNTER — DOCUMENTATION ONLY (OUTPATIENT)
Dept: SLEEP MEDICINE | Facility: CLINIC | Age: 65
End: 2025-08-05
Payer: COMMERCIAL

## 2025-08-08 ENCOUNTER — TELEPHONE (OUTPATIENT)
Dept: SLEEP MEDICINE | Facility: CLINIC | Age: 65
End: 2025-08-08
Payer: COMMERCIAL

## 2025-08-12 ENCOUNTER — DOCUMENTATION ONLY (OUTPATIENT)
Dept: SLEEP MEDICINE | Facility: CLINIC | Age: 65
End: 2025-08-12
Payer: COMMERCIAL

## 2025-08-17 DIAGNOSIS — E11.22 TYPE 2 DIABETES MELLITUS WITH STAGE 3 CHRONIC KIDNEY DISEASE, WITHOUT LONG-TERM CURRENT USE OF INSULIN, UNSPECIFIED WHETHER STAGE 3A OR 3B CKD (H): ICD-10-CM

## 2025-08-17 DIAGNOSIS — N18.30 TYPE 2 DIABETES MELLITUS WITH STAGE 3 CHRONIC KIDNEY DISEASE, WITHOUT LONG-TERM CURRENT USE OF INSULIN, UNSPECIFIED WHETHER STAGE 3A OR 3B CKD (H): ICD-10-CM

## 2025-08-17 DIAGNOSIS — N18.30 STAGE 3 CHRONIC KIDNEY DISEASE, UNSPECIFIED WHETHER STAGE 3A OR 3B CKD (H): ICD-10-CM

## 2025-08-18 RX ORDER — EMPAGLIFLOZIN 25 MG/1
25 TABLET, FILM COATED ORAL DAILY
Qty: 30 TABLET | Refills: 1 | Status: SHIPPED | OUTPATIENT
Start: 2025-08-18

## 2025-08-19 DIAGNOSIS — E11.9 TYPE 2 DIABETES MELLITUS WITHOUT COMPLICATION, WITHOUT LONG-TERM CURRENT USE OF INSULIN (H): ICD-10-CM

## 2025-08-19 RX ORDER — TIRZEPATIDE 5 MG/.5ML
INJECTION, SOLUTION SUBCUTANEOUS
Qty: 6 ML | Refills: 0 | Status: SHIPPED | OUTPATIENT
Start: 2025-08-19